# Patient Record
Sex: MALE | Race: WHITE | URBAN - METROPOLITAN AREA
[De-identification: names, ages, dates, MRNs, and addresses within clinical notes are randomized per-mention and may not be internally consistent; named-entity substitution may affect disease eponyms.]

---

## 2021-07-26 ENCOUNTER — OFFICE VISIT (OUTPATIENT)
Dept: INTERNAL MEDICINE CLINIC | Facility: CLINIC | Age: 56
End: 2021-07-26
Payer: COMMERCIAL

## 2021-07-26 VITALS
HEIGHT: 73 IN | TEMPERATURE: 98 F | RESPIRATION RATE: 16 BRPM | OXYGEN SATURATION: 98 % | BODY MASS INDEX: 31.54 KG/M2 | WEIGHT: 238 LBS | DIASTOLIC BLOOD PRESSURE: 74 MMHG | SYSTOLIC BLOOD PRESSURE: 124 MMHG | HEART RATE: 85 BPM

## 2021-07-26 DIAGNOSIS — Z00.00 PHYSICAL EXAM: Primary | ICD-10-CM

## 2021-07-26 DIAGNOSIS — K58.9 IRRITABLE BOWEL SYNDROME, UNSPECIFIED TYPE: ICD-10-CM

## 2021-07-26 DIAGNOSIS — Z91.013 SHELLFISH ALLERGY: ICD-10-CM

## 2021-07-26 DIAGNOSIS — B00.9 HSV-1 INFECTION: ICD-10-CM

## 2021-07-26 PROCEDURE — 3008F BODY MASS INDEX DOCD: CPT | Performed by: INTERNAL MEDICINE

## 2021-07-26 PROCEDURE — 99386 PREV VISIT NEW AGE 40-64: CPT | Performed by: INTERNAL MEDICINE

## 2021-07-26 PROCEDURE — 3078F DIAST BP <80 MM HG: CPT | Performed by: INTERNAL MEDICINE

## 2021-07-26 PROCEDURE — 3074F SYST BP LT 130 MM HG: CPT | Performed by: INTERNAL MEDICINE

## 2021-07-26 PROCEDURE — 93000 ELECTROCARDIOGRAM COMPLETE: CPT | Performed by: INTERNAL MEDICINE

## 2021-07-26 RX ORDER — DICYCLOMINE HCL 20 MG
20 TABLET ORAL DAILY PRN
COMMUNITY

## 2021-07-26 RX ORDER — ACYCLOVIR 400 MG/1
400 TABLET ORAL 3 TIMES DAILY
Qty: 30 TABLET | Refills: 0 | COMMUNITY
Start: 2021-07-26

## 2021-07-26 RX ORDER — EPINEPHRINE 0.3 MG/.3ML
0.3 INJECTION SUBCUTANEOUS DAILY PRN
COMMUNITY
Start: 2019-03-29 | End: 2021-07-26

## 2021-07-26 RX ORDER — EPINEPHRINE 0.3 MG/.3ML
0.3 INJECTION SUBCUTANEOUS SEE ADMIN INSTRUCTIONS
Qty: 1 EACH | Refills: 1 | Status: SHIPPED | OUTPATIENT
Start: 2021-07-26

## 2021-07-26 NOTE — PROGRESS NOTES
Marley Argueta is a 48year old male who presents for a complete physical exam.   HPI:   Pt complains of:    Patient presents with:  Establish Care: Pt would like to establish care with new PCP. Works for the [place], Recently moved to area from [place].  Pt Laterality Date   • INGUINAL HERNIA REPAIR Bilateral 1991   • TONSILLECTOMY        Family History   Problem Relation Age of Onset   • Heart Disorder Father    • Cancer Father       Social History:  Social History    Tobacco Use      Smoking status: Never S normoactive  Extremities exam: no clubbing no cyanosis no edema  Skin exam: No obvious wounds, no rashes  Neurological exam: Cranial nerves II through XII intact, no gross deficits  Musculoskeletal exam: no Arthritis appreciated, no obvious deformity  : documentation.     Anjali Ba,   7/26/2021  6:27 PM

## 2021-07-26 NOTE — PATIENT INSTRUCTIONS
1. Physical exam  Physical exam instruction: Improve diet and exercise, complete fasting labs in the near future and you will be called with results 5-7 days after completed, call with questions.   Call the central scheduling number at [phone] to sched

## 2021-08-27 ENCOUNTER — LAB ENCOUNTER (OUTPATIENT)
Dept: LAB | Age: 56
End: 2021-08-27
Attending: INTERNAL MEDICINE
Payer: COMMERCIAL

## 2021-08-27 DIAGNOSIS — Z00.00 PHYSICAL EXAM: ICD-10-CM

## 2021-08-27 LAB
ALBUMIN SERPL-MCNC: 4.1 G/DL (ref 3.4–5)
ALBUMIN/GLOB SERPL: 1.2 {RATIO} (ref 1–2)
ALP LIVER SERPL-CCNC: 57 U/L
ALT SERPL-CCNC: 32 U/L
ANION GAP SERPL CALC-SCNC: 8 MMOL/L (ref 0–18)
AST SERPL-CCNC: 15 U/L (ref 15–37)
BASOPHILS # BLD AUTO: 0.05 X10(3) UL (ref 0–0.2)
BASOPHILS NFR BLD AUTO: 0.9 %
BILIRUB SERPL-MCNC: 1.6 MG/DL (ref 0.1–2)
BILIRUB UR QL: NEGATIVE
BUN BLD-MCNC: 14 MG/DL (ref 7–18)
BUN/CREAT SERPL: 14.6 (ref 10–20)
CALCIUM BLD-MCNC: 8.9 MG/DL (ref 8.5–10.1)
CHLORIDE SERPL-SCNC: 109 MMOL/L (ref 98–112)
CHOLEST SMN-MCNC: 184 MG/DL (ref ?–200)
CO2 SERPL-SCNC: 24 MMOL/L (ref 21–32)
COLOR UR: YELLOW
COMPLEXED PSA SERPL-MCNC: 2.92 NG/ML (ref ?–4)
CREAT BLD-MCNC: 0.96 MG/DL
DEPRECATED RDW RBC AUTO: 40.2 FL (ref 35.1–46.3)
EOSINOPHIL # BLD AUTO: 0.31 X10(3) UL (ref 0–0.7)
EOSINOPHIL NFR BLD AUTO: 5.5 %
ERYTHROCYTE [DISTWIDTH] IN BLOOD BY AUTOMATED COUNT: 12.4 % (ref 11–15)
GLOBULIN PLAS-MCNC: 3.3 G/DL (ref 2.8–4.4)
GLUCOSE BLD-MCNC: 102 MG/DL (ref 70–99)
GLUCOSE UR-MCNC: NEGATIVE MG/DL
HCT VFR BLD AUTO: 48.1 %
HDLC SERPL-MCNC: 45 MG/DL (ref 40–59)
HGB BLD-MCNC: 15.9 G/DL
HGB UR QL STRIP.AUTO: NEGATIVE
IMM GRANULOCYTES # BLD AUTO: 0.03 X10(3) UL (ref 0–1)
IMM GRANULOCYTES NFR BLD: 0.5 %
KETONES UR-MCNC: NEGATIVE MG/DL
LDLC SERPL CALC-MCNC: 126 MG/DL (ref ?–100)
LEUKOCYTE ESTERASE UR QL STRIP.AUTO: NEGATIVE
LYMPHOCYTES # BLD AUTO: 2 X10(3) UL (ref 1–4)
LYMPHOCYTES NFR BLD AUTO: 35.3 %
M PROTEIN MFR SERPL ELPH: 7.4 G/DL (ref 6.4–8.2)
MCH RBC QN AUTO: 29.1 PG (ref 26–34)
MCHC RBC AUTO-ENTMCNC: 33.1 G/DL (ref 31–37)
MCV RBC AUTO: 88.1 FL
MONOCYTES # BLD AUTO: 0.51 X10(3) UL (ref 0.1–1)
MONOCYTES NFR BLD AUTO: 9 %
NEUTROPHILS # BLD AUTO: 2.76 X10 (3) UL (ref 1.5–7.7)
NEUTROPHILS # BLD AUTO: 2.76 X10(3) UL (ref 1.5–7.7)
NEUTROPHILS NFR BLD AUTO: 48.8 %
NITRITE UR QL STRIP.AUTO: NEGATIVE
NONHDLC SERPL-MCNC: 139 MG/DL (ref ?–130)
OSMOLALITY SERPL CALC.SUM OF ELEC: 293 MOSM/KG (ref 275–295)
PATIENT FASTING Y/N/NP: YES
PATIENT FASTING Y/N/NP: YES
PH UR: 5 [PH] (ref 5–8)
PLATELET # BLD AUTO: 233 10(3)UL (ref 150–450)
POTASSIUM SERPL-SCNC: 4.3 MMOL/L (ref 3.5–5.1)
PROT UR-MCNC: 30 MG/DL
RBC # BLD AUTO: 5.46 X10(6)UL
SODIUM SERPL-SCNC: 141 MMOL/L (ref 136–145)
SP GR UR STRIP: 1.03 (ref 1–1.03)
TRIGL SERPL-MCNC: 67 MG/DL (ref 30–149)
TSI SER-ACNC: 1.5 MIU/ML (ref 0.36–3.74)
UROBILINOGEN UR STRIP-ACNC: <2
VIT B12 SERPL-MCNC: 302 PG/ML (ref 193–986)
VIT D+METAB SERPL-MCNC: 20.9 NG/ML (ref 30–100)
VLDLC SERPL CALC-MCNC: 12 MG/DL (ref 0–30)
WBC # BLD AUTO: 5.7 X10(3) UL (ref 4–11)

## 2021-08-27 PROCEDURE — 85025 COMPLETE CBC W/AUTO DIFF WBC: CPT

## 2021-08-27 PROCEDURE — 82306 VITAMIN D 25 HYDROXY: CPT

## 2021-08-27 PROCEDURE — 81001 URINALYSIS AUTO W/SCOPE: CPT | Performed by: INTERNAL MEDICINE

## 2021-08-27 PROCEDURE — 84402 ASSAY OF FREE TESTOSTERONE: CPT

## 2021-08-27 PROCEDURE — 80053 COMPREHEN METABOLIC PANEL: CPT

## 2021-08-27 PROCEDURE — 82607 VITAMIN B-12: CPT

## 2021-08-27 PROCEDURE — 36415 COLL VENOUS BLD VENIPUNCTURE: CPT

## 2021-08-27 PROCEDURE — 84443 ASSAY THYROID STIM HORMONE: CPT

## 2021-08-27 PROCEDURE — 84403 ASSAY OF TOTAL TESTOSTERONE: CPT

## 2021-08-27 PROCEDURE — 80061 LIPID PANEL: CPT

## 2021-09-03 LAB
TESTOSTERONE, FREE, S: 17.9 NG/DL
TESTOSTERONE, TOTAL, S: 525 NG/DL

## 2021-09-09 ENCOUNTER — PATIENT MESSAGE (OUTPATIENT)
Dept: INTERNAL MEDICINE CLINIC | Facility: CLINIC | Age: 56
End: 2021-09-09

## 2021-09-09 NOTE — TELEPHONE ENCOUNTER
From: Kendall Solis Doelling  To: Mari Parmar DO  Sent: 9/9/2021 5:25 AM CDT  Subject: Test Results Question    PSA was 1.43 last year. Making sure this increase (nearly double) is ok.  Thanks

## 2021-09-10 ENCOUNTER — TELEPHONE (OUTPATIENT)
Dept: INTERNAL MEDICINE CLINIC | Facility: CLINIC | Age: 56
End: 2021-09-10

## 2021-09-10 DIAGNOSIS — R97.20 RISING PSA LEVEL: Primary | ICD-10-CM

## 2021-10-10 ENCOUNTER — PATIENT MESSAGE (OUTPATIENT)
Dept: INTERNAL MEDICINE CLINIC | Facility: CLINIC | Age: 56
End: 2021-10-10

## 2021-10-10 DIAGNOSIS — R97.20 RISING PSA LEVEL: Primary | ICD-10-CM

## 2021-10-11 ENCOUNTER — PATIENT MESSAGE (OUTPATIENT)
Dept: INTERNAL MEDICINE CLINIC | Facility: CLINIC | Age: 56
End: 2021-10-11

## 2021-10-11 NOTE — TELEPHONE ENCOUNTER
From: Bartholomew Lundborg Doelling  To: Tesfaye Moreno DO  Sent: 9/9/2021 5:25 AM CDT  Subject: Test Results Question    PSA was 1.43 last year. Making sure this increase (nearly double) is ok.  Thanks

## 2021-10-11 NOTE — TELEPHONE ENCOUNTER
To Dr. Christianne Pereira to advise on new patient request for patients son. UC advised for son due to acute symptoms.

## 2021-10-12 NOTE — TELEPHONE ENCOUNTER
From: Roxie Cardoso  Sent: 10/11/2021 6:07 PM CDT  To: [facility] Clinical Staff  Subject: RE: Test Results Question    Excellent.  Thank you!!!

## 2022-02-22 ENCOUNTER — TELEPHONE (OUTPATIENT)
Dept: INTERNAL MEDICINE CLINIC | Facility: CLINIC | Age: 57
End: 2022-02-22

## 2022-02-22 RX ORDER — ACYCLOVIR 400 MG/1
400 TABLET ORAL 3 TIMES DAILY
Qty: 30 TABLET | Refills: 1 | Status: SHIPPED | OUTPATIENT
Start: 2022-02-22

## 2022-02-22 NOTE — TELEPHONE ENCOUNTER
Per OV 7/26/21:  \"4. HSV-1 infection  Acyclovir tabs are fine with me to use as you have been, realize there is different things out there such as Denavir cream, but you would have to call me for that. - acyclovir 400 MG Oral Tab; Take 1 tablet (400 mg total) by mouth 3 (three) times daily. For 3 to 4 days as directed  Dispense: 30 tablet; Refill: 0\"    To Dr. Terence Feldman to please advise on refill, med was added historically by you on 7/26/21 but it doesn't look like you have ever prescribed before. Patient requesting via my chart.

## 2022-06-22 ENCOUNTER — TELEPHONE (OUTPATIENT)
Dept: INTERNAL MEDICINE CLINIC | Facility: CLINIC | Age: 57
End: 2022-06-22

## 2022-06-29 ENCOUNTER — PATIENT MESSAGE (OUTPATIENT)
Dept: INTERNAL MEDICINE CLINIC | Facility: CLINIC | Age: 57
End: 2022-06-29

## 2022-08-01 ENCOUNTER — OFFICE VISIT (OUTPATIENT)
Dept: INTERNAL MEDICINE CLINIC | Facility: CLINIC | Age: 57
End: 2022-08-01
Payer: COMMERCIAL

## 2022-08-01 VITALS
BODY MASS INDEX: 32.36 KG/M2 | HEIGHT: 73 IN | SYSTOLIC BLOOD PRESSURE: 124 MMHG | OXYGEN SATURATION: 97 % | WEIGHT: 244.13 LBS | HEART RATE: 72 BPM | DIASTOLIC BLOOD PRESSURE: 80 MMHG

## 2022-08-01 DIAGNOSIS — B00.9 HSV-1 INFECTION: ICD-10-CM

## 2022-08-01 DIAGNOSIS — Z00.00 ANNUAL PHYSICAL EXAM: Primary | ICD-10-CM

## 2022-08-01 DIAGNOSIS — B35.1 ONYCHOMYCOSIS: ICD-10-CM

## 2022-08-01 DIAGNOSIS — Z13.220 SCREENING FOR LIPOID DISORDERS: ICD-10-CM

## 2022-08-01 DIAGNOSIS — Z12.5 SCREENING FOR PROSTATE CANCER: ICD-10-CM

## 2022-08-01 DIAGNOSIS — Z13.0 SCREENING FOR DEFICIENCY ANEMIA: ICD-10-CM

## 2022-08-01 DIAGNOSIS — Z13.1 SCREENING FOR DIABETES MELLITUS: ICD-10-CM

## 2022-08-01 DIAGNOSIS — Z12.11 SCREENING FOR COLON CANCER: ICD-10-CM

## 2022-08-01 DIAGNOSIS — K58.9 IRRITABLE BOWEL SYNDROME, UNSPECIFIED TYPE: ICD-10-CM

## 2022-08-01 DIAGNOSIS — Z13.29 SCREENING FOR THYROID DISORDER: ICD-10-CM

## 2022-08-01 DIAGNOSIS — R20.2 BILATERAL LEG PARESTHESIA: ICD-10-CM

## 2022-08-01 PROCEDURE — 3079F DIAST BP 80-89 MM HG: CPT | Performed by: INTERNAL MEDICINE

## 2022-08-01 PROCEDURE — 99396 PREV VISIT EST AGE 40-64: CPT | Performed by: INTERNAL MEDICINE

## 2022-08-01 PROCEDURE — 3008F BODY MASS INDEX DOCD: CPT | Performed by: INTERNAL MEDICINE

## 2022-08-01 PROCEDURE — 3074F SYST BP LT 130 MM HG: CPT | Performed by: INTERNAL MEDICINE

## 2022-08-01 PROCEDURE — 90715 TDAP VACCINE 7 YRS/> IM: CPT | Performed by: INTERNAL MEDICINE

## 2022-08-01 PROCEDURE — 90471 IMMUNIZATION ADMIN: CPT | Performed by: INTERNAL MEDICINE

## 2022-08-01 RX ORDER — ACYCLOVIR 400 MG/1
400 TABLET ORAL 3 TIMES DAILY
Qty: 30 TABLET | Refills: 1 | Status: SHIPPED | OUTPATIENT
Start: 2022-08-01

## 2022-08-01 RX ORDER — SILDENAFIL CITRATE 20 MG/1
10 TABLET ORAL
Qty: 30 TABLET | Refills: 1 | Status: SHIPPED | OUTPATIENT
Start: 2022-08-01

## 2022-08-01 RX ORDER — EPINEPHRINE 0.3 MG/.3ML
0.3 INJECTION SUBCUTANEOUS SEE ADMIN INSTRUCTIONS
Qty: 1 EACH | Refills: 3 | Status: SHIPPED | OUTPATIENT
Start: 2022-08-01

## 2022-08-01 RX ORDER — DICYCLOMINE HCL 20 MG
20 TABLET ORAL
Qty: 120 TABLET | Refills: 3 | Status: SHIPPED | OUTPATIENT
Start: 2022-08-01

## 2022-08-01 RX ORDER — HYDROCORTISONE 25 MG/G
1 CREAM TOPICAL 2 TIMES DAILY
Qty: 1 EACH | Refills: 3 | Status: SHIPPED | OUTPATIENT
Start: 2022-08-01

## 2022-08-04 ENCOUNTER — PATIENT MESSAGE (OUTPATIENT)
Dept: INTERNAL MEDICINE CLINIC | Facility: CLINIC | Age: 57
End: 2022-08-04

## 2022-08-04 ENCOUNTER — LAB ENCOUNTER (OUTPATIENT)
Dept: LAB | Age: 57
End: 2022-08-04
Attending: INTERNAL MEDICINE
Payer: COMMERCIAL

## 2022-08-04 DIAGNOSIS — E55.9 VITAMIN D DEFICIENCY: Primary | ICD-10-CM

## 2022-08-04 DIAGNOSIS — R20.2 BILATERAL LEG PARESTHESIA: ICD-10-CM

## 2022-08-04 DIAGNOSIS — E55.9 VITAMIN D DEFICIENCY: ICD-10-CM

## 2022-08-04 DIAGNOSIS — Z13.220 SCREENING FOR LIPOID DISORDERS: ICD-10-CM

## 2022-08-04 DIAGNOSIS — Z13.1 SCREENING FOR DIABETES MELLITUS: ICD-10-CM

## 2022-08-04 DIAGNOSIS — Z12.5 SCREENING FOR PROSTATE CANCER: ICD-10-CM

## 2022-08-04 DIAGNOSIS — Z13.29 SCREENING FOR THYROID DISORDER: ICD-10-CM

## 2022-08-04 DIAGNOSIS — Z00.00 ANNUAL PHYSICAL EXAM: ICD-10-CM

## 2022-08-04 DIAGNOSIS — Z13.0 SCREENING FOR DEFICIENCY ANEMIA: ICD-10-CM

## 2022-08-04 DIAGNOSIS — R97.20 ELEVATED PSA: ICD-10-CM

## 2022-08-04 LAB
ALBUMIN SERPL-MCNC: 4 G/DL (ref 3.4–5)
ALBUMIN/GLOB SERPL: 1.1 {RATIO} (ref 1–2)
ALP LIVER SERPL-CCNC: 58 U/L
ALT SERPL-CCNC: 29 U/L
ANION GAP SERPL CALC-SCNC: 7 MMOL/L (ref 0–18)
AST SERPL-CCNC: 15 U/L (ref 15–37)
BASOPHILS # BLD AUTO: 0.05 X10(3) UL (ref 0–0.2)
BASOPHILS NFR BLD AUTO: 0.8 %
BILIRUB SERPL-MCNC: 1.6 MG/DL (ref 0.1–2)
BILIRUB UR QL: NEGATIVE
BUN BLD-MCNC: 17 MG/DL (ref 7–18)
BUN/CREAT SERPL: 17 (ref 10–20)
CALCIUM BLD-MCNC: 9.6 MG/DL (ref 8.5–10.1)
CHLORIDE SERPL-SCNC: 109 MMOL/L (ref 98–112)
CHOLEST SERPL-MCNC: 188 MG/DL (ref ?–200)
CO2 SERPL-SCNC: 25 MMOL/L (ref 21–32)
COLOR UR: YELLOW
COMPLEXED PSA SERPL-MCNC: 4.76 NG/ML (ref ?–4)
CREAT BLD-MCNC: 1 MG/DL
DEPRECATED RDW RBC AUTO: 39.1 FL (ref 35.1–46.3)
EOSINOPHIL # BLD AUTO: 0.32 X10(3) UL (ref 0–0.7)
EOSINOPHIL NFR BLD AUTO: 5.1 %
ERYTHROCYTE [DISTWIDTH] IN BLOOD BY AUTOMATED COUNT: 12.2 % (ref 11–15)
ERYTHROCYTE [SEDIMENTATION RATE] IN BLOOD: 12 MM/HR
FASTING PATIENT LIPID ANSWER: YES
FASTING STATUS PATIENT QL REPORTED: YES
GFR SERPLBLD BASED ON 1.73 SQ M-ARVRAT: 89 ML/MIN/1.73M2 (ref 60–?)
GLOBULIN PLAS-MCNC: 3.6 G/DL (ref 2.8–4.4)
GLUCOSE BLD-MCNC: 91 MG/DL (ref 70–99)
GLUCOSE UR-MCNC: NEGATIVE MG/DL
HCT VFR BLD AUTO: 48.5 %
HDLC SERPL-MCNC: 39 MG/DL (ref 40–59)
HGB BLD-MCNC: 15.9 G/DL
HGB UR QL STRIP.AUTO: NEGATIVE
IMM GRANULOCYTES # BLD AUTO: 0.02 X10(3) UL (ref 0–1)
IMM GRANULOCYTES NFR BLD: 0.3 %
KETONES UR-MCNC: NEGATIVE MG/DL
LDLC SERPL CALC-MCNC: 134 MG/DL (ref ?–100)
LEUKOCYTE ESTERASE UR QL STRIP.AUTO: NEGATIVE
LYMPHOCYTES # BLD AUTO: 1.97 X10(3) UL (ref 1–4)
LYMPHOCYTES NFR BLD AUTO: 31.6 %
MCH RBC QN AUTO: 28.6 PG (ref 26–34)
MCHC RBC AUTO-ENTMCNC: 32.8 G/DL (ref 31–37)
MCV RBC AUTO: 87.4 FL
MONOCYTES # BLD AUTO: 0.54 X10(3) UL (ref 0.1–1)
MONOCYTES NFR BLD AUTO: 8.7 %
NEUTROPHILS # BLD AUTO: 3.33 X10 (3) UL (ref 1.5–7.7)
NEUTROPHILS # BLD AUTO: 3.33 X10(3) UL (ref 1.5–7.7)
NEUTROPHILS NFR BLD AUTO: 53.5 %
NITRITE UR QL STRIP.AUTO: NEGATIVE
NONHDLC SERPL-MCNC: 149 MG/DL (ref ?–130)
OSMOLALITY SERPL CALC.SUM OF ELEC: 293 MOSM/KG (ref 275–295)
PH UR: 5.5 [PH] (ref 5–8)
PLATELET # BLD AUTO: 242 10(3)UL (ref 150–450)
POTASSIUM SERPL-SCNC: 4.5 MMOL/L (ref 3.5–5.1)
PROT SERPL-MCNC: 7.6 G/DL (ref 6.4–8.2)
PROT UR-MCNC: NEGATIVE MG/DL
RBC # BLD AUTO: 5.55 X10(6)UL
SODIUM SERPL-SCNC: 141 MMOL/L (ref 136–145)
SP GR UR STRIP: 1.02 (ref 1–1.03)
TRIGL SERPL-MCNC: 80 MG/DL (ref 30–149)
TSI SER-ACNC: 1.38 MIU/ML (ref 0.36–3.74)
UROBILINOGEN UR STRIP-ACNC: 0.2
VIT B12 SERPL-MCNC: 330 PG/ML (ref 193–986)
VIT D+METAB SERPL-MCNC: 23.5 NG/ML (ref 30–100)
VLDLC SERPL CALC-MCNC: 15 MG/DL (ref 0–30)
WBC # BLD AUTO: 6.2 X10(3) UL (ref 4–11)

## 2022-08-04 PROCEDURE — 81003 URINALYSIS AUTO W/O SCOPE: CPT

## 2022-08-04 PROCEDURE — 80061 LIPID PANEL: CPT

## 2022-08-04 PROCEDURE — 85025 COMPLETE CBC W/AUTO DIFF WBC: CPT

## 2022-08-04 PROCEDURE — 36415 COLL VENOUS BLD VENIPUNCTURE: CPT

## 2022-08-04 PROCEDURE — 86038 ANTINUCLEAR ANTIBODIES: CPT

## 2022-08-04 PROCEDURE — 84443 ASSAY THYROID STIM HORMONE: CPT

## 2022-08-04 PROCEDURE — 82607 VITAMIN B-12: CPT

## 2022-08-04 PROCEDURE — 80053 COMPREHEN METABOLIC PANEL: CPT

## 2022-08-04 PROCEDURE — 85652 RBC SED RATE AUTOMATED: CPT

## 2022-08-04 PROCEDURE — 82306 VITAMIN D 25 HYDROXY: CPT

## 2022-08-04 NOTE — TELEPHONE ENCOUNTER
From: Yasmani Slaughter  To: Justin Mcclendon MD  Sent: 8/4/2022 2:48 PM CDT  Subject: Question regarding PSA SCREEN    I am concerned (hopefully inappropriately!) about PSA and family history (dad).    PSA results:  2/24/20 1.43  8/27/21 2.92  Today 4.76

## 2022-08-08 ENCOUNTER — PATIENT MESSAGE (OUTPATIENT)
Dept: INTERNAL MEDICINE CLINIC | Facility: CLINIC | Age: 57
End: 2022-08-08

## 2022-08-08 LAB — NUCLEAR IGG TITR SER IF: NEGATIVE {TITER}

## 2022-08-09 ENCOUNTER — TELEPHONE (OUTPATIENT)
Dept: INTERNAL MEDICINE CLINIC | Facility: CLINIC | Age: 57
End: 2022-08-09

## 2022-08-20 ENCOUNTER — PATIENT MESSAGE (OUTPATIENT)
Dept: INTERNAL MEDICINE CLINIC | Facility: CLINIC | Age: 57
End: 2022-08-20

## 2022-08-20 ENCOUNTER — HOSPITAL ENCOUNTER (OUTPATIENT)
Dept: GENERAL RADIOLOGY | Age: 57
Discharge: HOME | End: 2022-08-20
Attending: INTERNAL MEDICINE
Payer: COMMERCIAL

## 2022-08-20 DIAGNOSIS — R20.2 BILATERAL LEG PARESTHESIA: ICD-10-CM

## 2022-08-20 PROCEDURE — 72100 X-RAY EXAM L-S SPINE 2/3 VWS: CPT | Performed by: INTERNAL MEDICINE

## 2022-08-22 ENCOUNTER — PATIENT MESSAGE (OUTPATIENT)
Dept: INTERNAL MEDICINE CLINIC | Facility: CLINIC | Age: 57
End: 2022-08-22

## 2022-08-22 RX ORDER — SILDENAFIL CITRATE 20 MG/1
10 TABLET ORAL
Qty: 30 TABLET | Refills: 1 | Status: SHIPPED | OUTPATIENT
Start: 2022-08-22

## 2022-08-25 NOTE — TELEPHONE ENCOUNTER
I sent a [employer] message as fit test was negative    X-ray with degenerative disc and facet disease at L4-L5 and L5-S1    [employer] message sent

## 2022-10-03 ENCOUNTER — PATIENT MESSAGE (OUTPATIENT)
Dept: INTERNAL MEDICINE CLINIC | Facility: CLINIC | Age: 57
End: 2022-10-03

## 2022-10-03 ENCOUNTER — TELEPHONE (OUTPATIENT)
Dept: INTERNAL MEDICINE CLINIC | Facility: CLINIC | Age: 57
End: 2022-10-03

## 2022-10-03 NOTE — TELEPHONE ENCOUNTER
Spoke with patient who reports he has been feeling overwhelmed lately with increased stressors and triggering work events. He has an appointment later today with his therapist and is hoping to get in to see Dr. Maame Hay as well. He did have COVID in September but reports no fevers since 9/23/22. Foginess seems to be lingering. Patient denied any feelings of suicidal or homicidal ideation. He is meeting with his therapist later today. TO Dr. Maame Hay-- scheduled patient for tomorrow afternoon. Patient needed later appointment due to work schedule.

## 2022-10-05 ENCOUNTER — TELEPHONE (OUTPATIENT)
Dept: INTERNAL MEDICINE CLINIC | Facility: CLINIC | Age: 57
End: 2022-10-05

## 2023-03-31 RX ORDER — CITALOPRAM 10 MG/1
10 TABLET ORAL DAILY
Qty: 90 TABLET | Refills: 0 | Status: SHIPPED | OUTPATIENT
Start: 2023-03-31

## 2023-06-12 ENCOUNTER — PATIENT MESSAGE (OUTPATIENT)
Dept: INTERNAL MEDICINE CLINIC | Facility: CLINIC | Age: 58
End: 2023-06-12

## 2023-06-12 NOTE — TELEPHONE ENCOUNTER
From: Varinder Mccormick  To: Yevgeniy Andrew MD  Sent: 6/12/2023 10:52 AM CDT  Subject: PSA test    I have a follow up appt with Dr. Aureliano Keller 8/22/23. She asked that I bring lab and test results.  Can you add a PSA (and anything you think is helpful) to my current blood work ordered in preparation for my 8/15 annual physical? THANKS

## 2023-06-30 RX ORDER — CITALOPRAM HYDROBROMIDE 10 MG/1
TABLET ORAL
Qty: 90 TABLET | Refills: 3 | Status: SHIPPED | OUTPATIENT
Start: 2023-06-30

## 2023-07-02 DIAGNOSIS — B00.9 HSV-1 INFECTION: ICD-10-CM

## 2023-07-03 RX ORDER — ACYCLOVIR 400 MG/1
400 TABLET ORAL 3 TIMES DAILY
Qty: 30 TABLET | Refills: 0 | Status: SHIPPED | OUTPATIENT
Start: 2023-07-03

## 2023-08-15 ENCOUNTER — LAB ENCOUNTER (OUTPATIENT)
Dept: LAB | Age: 58
End: 2023-08-15
Attending: INTERNAL MEDICINE
Payer: COMMERCIAL

## 2023-08-15 DIAGNOSIS — Z12.5 SCREENING FOR PROSTATE CANCER: ICD-10-CM

## 2023-08-15 DIAGNOSIS — Z13.1 SCREENING FOR DIABETES MELLITUS: ICD-10-CM

## 2023-08-15 DIAGNOSIS — Z13.29 SCREENING FOR THYROID DISORDER: ICD-10-CM

## 2023-08-15 DIAGNOSIS — F41.9 ANXIETY: ICD-10-CM

## 2023-08-15 DIAGNOSIS — R97.20 ELEVATED PSA: ICD-10-CM

## 2023-08-15 DIAGNOSIS — E55.9 VITAMIN D DEFICIENCY: ICD-10-CM

## 2023-08-15 DIAGNOSIS — Z13.0 SCREENING FOR DEFICIENCY ANEMIA: ICD-10-CM

## 2023-08-15 DIAGNOSIS — R20.2 PARESTHESIAS: ICD-10-CM

## 2023-08-15 DIAGNOSIS — Z13.220 SCREENING FOR LIPOID DISORDERS: ICD-10-CM

## 2023-08-15 LAB
ALBUMIN SERPL-MCNC: 3.9 G/DL (ref 3.4–5)
ALBUMIN/GLOB SERPL: 1.2 {RATIO} (ref 1–2)
ALP LIVER SERPL-CCNC: 56 U/L
ALT SERPL-CCNC: 28 U/L
ANION GAP SERPL CALC-SCNC: 5 MMOL/L (ref 0–18)
AST SERPL-CCNC: 15 U/L (ref 15–37)
BASOPHILS # BLD AUTO: 0.05 X10(3) UL (ref 0–0.2)
BASOPHILS NFR BLD AUTO: 0.9 %
BILIRUB SERPL-MCNC: 1.3 MG/DL (ref 0.1–2)
BUN BLD-MCNC: 14 MG/DL (ref 7–18)
BUN/CREAT SERPL: 13.9 (ref 10–20)
CALCIUM BLD-MCNC: 9.1 MG/DL (ref 8.5–10.1)
CHLORIDE SERPL-SCNC: 109 MMOL/L (ref 98–112)
CHOLEST SERPL-MCNC: 168 MG/DL (ref ?–200)
CO2 SERPL-SCNC: 27 MMOL/L (ref 21–32)
CREAT BLD-MCNC: 1.01 MG/DL
DEPRECATED RDW RBC AUTO: 40.1 FL (ref 35.1–46.3)
EGFRCR SERPLBLD CKD-EPI 2021: 87 ML/MIN/1.73M2 (ref 60–?)
EOSINOPHIL # BLD AUTO: 0.2 X10(3) UL (ref 0–0.7)
EOSINOPHIL NFR BLD AUTO: 3.6 %
ERYTHROCYTE [DISTWIDTH] IN BLOOD BY AUTOMATED COUNT: 12.5 % (ref 11–15)
EST. AVERAGE GLUCOSE BLD GHB EST-MCNC: 111 MG/DL (ref 68–126)
FASTING PATIENT LIPID ANSWER: YES
FASTING STATUS PATIENT QL REPORTED: YES
GLOBULIN PLAS-MCNC: 3.2 G/DL (ref 2.8–4.4)
GLUCOSE BLD-MCNC: 95 MG/DL (ref 70–99)
HBA1C MFR BLD: 5.5 % (ref ?–5.7)
HCT VFR BLD AUTO: 46.1 %
HDLC SERPL-MCNC: 42 MG/DL (ref 40–59)
HGB BLD-MCNC: 15.4 G/DL
IMM GRANULOCYTES # BLD AUTO: 0.02 X10(3) UL (ref 0–1)
IMM GRANULOCYTES NFR BLD: 0.4 %
LDLC SERPL CALC-MCNC: 115 MG/DL (ref ?–100)
LYMPHOCYTES # BLD AUTO: 1.64 X10(3) UL (ref 1–4)
LYMPHOCYTES NFR BLD AUTO: 29.5 %
MCH RBC QN AUTO: 29.2 PG (ref 26–34)
MCHC RBC AUTO-ENTMCNC: 33.4 G/DL (ref 31–37)
MCV RBC AUTO: 87.3 FL
MONOCYTES # BLD AUTO: 0.46 X10(3) UL (ref 0.1–1)
MONOCYTES NFR BLD AUTO: 8.3 %
NEUTROPHILS # BLD AUTO: 3.19 X10 (3) UL (ref 1.5–7.7)
NEUTROPHILS # BLD AUTO: 3.19 X10(3) UL (ref 1.5–7.7)
NEUTROPHILS NFR BLD AUTO: 57.3 %
NONHDLC SERPL-MCNC: 126 MG/DL (ref ?–130)
OSMOLALITY SERPL CALC.SUM OF ELEC: 292 MOSM/KG (ref 275–295)
PLATELET # BLD AUTO: 214 10(3)UL (ref 150–450)
POTASSIUM SERPL-SCNC: 4.4 MMOL/L (ref 3.5–5.1)
PROT SERPL-MCNC: 7.1 G/DL (ref 6.4–8.2)
PSA SERPL-MCNC: 2 NG/ML (ref ?–4)
RBC # BLD AUTO: 5.28 X10(6)UL
SODIUM SERPL-SCNC: 141 MMOL/L (ref 136–145)
TRIGL SERPL-MCNC: 58 MG/DL (ref 30–149)
TSI SER-ACNC: 1.17 MIU/ML (ref 0.36–3.74)
VIT D+METAB SERPL-MCNC: 25.6 NG/ML (ref 30–100)
VLDLC SERPL CALC-MCNC: 10 MG/DL (ref 0–30)
WBC # BLD AUTO: 5.6 X10(3) UL (ref 4–11)

## 2023-08-15 PROCEDURE — 80053 COMPREHEN METABOLIC PANEL: CPT

## 2023-08-15 PROCEDURE — 83036 HEMOGLOBIN GLYCOSYLATED A1C: CPT

## 2023-08-15 PROCEDURE — 84443 ASSAY THYROID STIM HORMONE: CPT

## 2023-08-15 PROCEDURE — 85025 COMPLETE CBC W/AUTO DIFF WBC: CPT

## 2023-08-15 PROCEDURE — 36415 COLL VENOUS BLD VENIPUNCTURE: CPT

## 2023-08-15 PROCEDURE — 82306 VITAMIN D 25 HYDROXY: CPT

## 2023-08-15 PROCEDURE — 84153 ASSAY OF PSA TOTAL: CPT

## 2023-08-15 PROCEDURE — 80061 LIPID PANEL: CPT

## 2023-08-18 ENCOUNTER — OFFICE VISIT (OUTPATIENT)
Dept: INTERNAL MEDICINE CLINIC | Facility: CLINIC | Age: 58
End: 2023-08-18

## 2023-08-18 VITALS
DIASTOLIC BLOOD PRESSURE: 72 MMHG | WEIGHT: 242 LBS | OXYGEN SATURATION: 96 % | TEMPERATURE: 99 F | BODY MASS INDEX: 32.07 KG/M2 | HEART RATE: 71 BPM | SYSTOLIC BLOOD PRESSURE: 124 MMHG | HEIGHT: 73 IN

## 2023-08-18 DIAGNOSIS — F41.9 ANXIETY: ICD-10-CM

## 2023-08-18 DIAGNOSIS — E55.9 VITAMIN D DEFICIENCY: ICD-10-CM

## 2023-08-18 DIAGNOSIS — R97.20 ELEVATED PSA: ICD-10-CM

## 2023-08-18 DIAGNOSIS — Z12.11 SCREENING FOR COLON CANCER: ICD-10-CM

## 2023-08-18 DIAGNOSIS — K58.9 IRRITABLE BOWEL SYNDROME, UNSPECIFIED TYPE: ICD-10-CM

## 2023-08-18 DIAGNOSIS — Z00.00 ANNUAL PHYSICAL EXAM: Primary | ICD-10-CM

## 2023-08-18 DIAGNOSIS — Z56.6 WORK-RELATED STRESS: ICD-10-CM

## 2023-08-18 PROCEDURE — 3074F SYST BP LT 130 MM HG: CPT | Performed by: INTERNAL MEDICINE

## 2023-08-18 PROCEDURE — 3008F BODY MASS INDEX DOCD: CPT | Performed by: INTERNAL MEDICINE

## 2023-08-18 PROCEDURE — 99396 PREV VISIT EST AGE 40-64: CPT | Performed by: INTERNAL MEDICINE

## 2023-08-18 PROCEDURE — 3078F DIAST BP <80 MM HG: CPT | Performed by: INTERNAL MEDICINE

## 2023-08-18 RX ORDER — CYCLOBENZAPRINE HCL 5 MG
5 TABLET ORAL NIGHTLY
Qty: 30 TABLET | Refills: 0 | Status: SHIPPED | OUTPATIENT
Start: 2023-08-18

## 2023-08-18 SDOH — HEALTH STABILITY - MENTAL HEALTH: OTHER PHYSICAL AND MENTAL STRAIN RELATED TO WORK: Z56.6

## 2023-08-18 NOTE — PATIENT INSTRUCTIONS
- You were seen in clinic for regular annual check-up. We did review your most recent set of blood work and you have done a great job of bringing down your cholesterol over time. We discussed holding off on cholesterol medication for now. We may need to consider this in the future if your numbers increase over time    - We are very to happy to hear that your work related stress and anxiety is under great control. Lets continue with your good stress coping mechanisms. Continue following with your therapist    - We will continue with the current medications in place for citalopram.    -The temporomandibular joint/TMJ pain may be related to your recent oral cleaning/manipulation. Lower suspicion that this is grinding teeth. However, we should try to reduce down the inflammation and improve the pain moving forward    We recommended:  - Trial of muscle relaxer to relieve any muscle tension in the area, cyclobenzaprine 5 mg nightly as needed. You may come off of the medication if symptoms improve in the next few days  - You can continue ibuprofen 200-400 mg every 4-6 hours as needed for anti-inflammatory pain relief    -Your PSA level has normalized, we will monitor this closely  -Please continue monitoring the numbness and tingling of the toes. If there is progression, we should consider EMG for further evaluation as your primary work-up has come back unremarkable. We could also consider antinerve related medication to reduce flareups  - Please continue to eat a varied diet including recommended servings of vegetables, fruits, and low fat dairy. Minimize high saturated fats (such as fast foods) and high sugar intake (such as soda)  - We recommend 150 minutes of moderate intensity exercise (brisk walking, swimming) weekly to maintain your current weight. Targeted weight loss will require more vigorous exercise or more than 150 minutes/week.     Return to clinic in 6 months for follow-up    You are doing outstanding with your overall health. Keep up the great work!

## 2023-08-22 ENCOUNTER — PATIENT MESSAGE (OUTPATIENT)
Dept: INTERNAL MEDICINE CLINIC | Facility: CLINIC | Age: 58
End: 2023-08-22

## 2023-08-22 NOTE — TELEPHONE ENCOUNTER
From: Aida Villavicencio  To: Jeanne Daniels MD  Sent: 8/22/2023 4:31 PM CDT  Subject: Follow up message from Dr EDWARD (urologist)    I just saw the urologist, and she believes I can be followed by you until further notice. She wants me to have an annual prostate exam an annual.PSA LEVEL and if there are any changes to the exam or any increase in my level, she will follow up with me at that time.

## 2023-10-02 RX ORDER — CYCLOBENZAPRINE HCL 5 MG
5 TABLET ORAL NIGHTLY
Qty: 30 TABLET | Refills: 0 | OUTPATIENT
Start: 2023-10-02

## 2023-10-02 NOTE — TELEPHONE ENCOUNTER
Current refill request refused due to refill is either a duplicate request or has active refills at the pharmacy. Check previous templates.     Requested Prescriptions     Refused Prescriptions Disp Refills    CYCLOBENZAPRINE 5 MG Oral Tab [Pharmacy Med Name: CYCLOBENZAPRINE 5 MG TABLET] 30 tablet 0     Sig: TAKE 1 TABLET BY MOUTH EVERY DAY AT NIGHT     Refused By: Amanda Tyler     Reason for Refusal: Refill not appropriate

## 2023-10-04 RX ORDER — CYCLOBENZAPRINE HCL 5 MG
5 TABLET ORAL NIGHTLY
Qty: 30 TABLET | Refills: 0 | OUTPATIENT
Start: 2023-10-04

## 2023-10-05 NOTE — TELEPHONE ENCOUNTER
For acute issue  Refill request has failed the Ambulatory Medication Refill Standing Order and is routed to the primary physician to review the following:    Requested Prescriptions     Refused Prescriptions Disp Refills    CYCLOBENZAPRINE 5 MG Oral Tab [Pharmacy Med Name: CYCLOBENZAPRINE 5 MG TABLET] 30 tablet 0     Sig: TAKE 1 TABLET BY MOUTH EVERY DAY AT NIGHT     Refused By: Alexandre Davenport     Reason for Refusal: Refill not appropriate

## 2023-11-02 ENCOUNTER — PATIENT MESSAGE (OUTPATIENT)
Dept: INTERNAL MEDICINE CLINIC | Facility: CLINIC | Age: 58
End: 2023-11-02

## 2023-11-02 NOTE — TELEPHONE ENCOUNTER
From: Herminio Mooney  Sent: 11/2/2023 9:13 AM CDT  To: [facility] Clinical Staff  Subject: Partner Jade Ginger) w/COVID    I noticed that I left off he tested positive for COVID last evening

## 2024-01-07 ENCOUNTER — PATIENT MESSAGE (OUTPATIENT)
Dept: INTERNAL MEDICINE CLINIC | Facility: CLINIC | Age: 59
End: 2024-01-07

## 2024-01-09 NOTE — TELEPHONE ENCOUNTER
From: Wilian King  To: Edgar Lala  Sent: 1/7/2024 11:25 AM CST  Subject: Need copy of lab order     I am going to complete the one day fecal immunochemical test and it states I need to include a copy of the lab order provided by my physician in envelope. Can a copy of the lab order be mailed to me?  Thanks and Happy Nee Year!

## 2024-01-22 ENCOUNTER — APPOINTMENT (OUTPATIENT)
Dept: LAB | Facility: HOSPITAL | Age: 59
End: 2024-01-22
Attending: INTERNAL MEDICINE
Payer: COMMERCIAL

## 2024-01-22 PROCEDURE — 82274 ASSAY TEST FOR BLOOD FECAL: CPT

## 2024-01-25 ENCOUNTER — TELEPHONE (OUTPATIENT)
Dept: INTERNAL MEDICINE CLINIC | Facility: CLINIC | Age: 59
End: 2024-01-25

## 2024-01-25 DIAGNOSIS — R19.5 POSITIVE FIT (FECAL IMMUNOCHEMICAL TEST): Primary | ICD-10-CM

## 2024-01-25 LAB — HEMOCCULT STL QL: POSITIVE

## 2024-01-25 NOTE — TELEPHONE ENCOUNTER
S/w patient and relayed MD message.  Verbalizes understanding and agreement with tx. plan     Pt will call Dr Olivares's office to scheduled GI appt.    Pt provided with Dr contact info.    ER advised for worsening sx's

## 2024-01-25 NOTE — TELEPHONE ENCOUNTER
Please refer the patient that the fecal occult blood test did come back positive.  He may need to see gastroenterology for consideration of colonoscopy to determine where this microscopic blood was detected (I.e. polyp).  Can see Dr. Law:    Ke Law MD [address] [place] [id number] [phone]

## 2024-02-01 ENCOUNTER — NURSE ONLY (OUTPATIENT)
Facility: CLINIC | Age: 59
End: 2024-02-01

## 2024-02-01 DIAGNOSIS — Z12.11 SCREENING FOR COLON CANCER: Primary | ICD-10-CM

## 2024-02-01 NOTE — PROGRESS NOTES
Dx: +FIT test   Colonoscopy with MAC sedation  Split dose golytely, sent to pharmacy  Please review prep instructions with patient    - If the patient is taking oral diabetic medications then they should HOLD diabetic medications the night before and/or the day of the procedure   - If the patient is on insulin, please have the PCP or endocrinologist assist with management of dosing prior to the procedure.  - NO herbal supplements or weight loss medications x 7 days prior to the procedure.  - If patient is taking Xarelto OR Eliquis then it needs to be helf for 48 hours prior to the procedure --> Should get approval from the prescribing physician (PCP or cardiologist) to hold this medication prior to the procedure.  - If the patient is taking Coumadin then it needs to be on hold Coumadin for 5 days prior to the procedure --> Should get approval from the prescribing physician (PCP or cardiologist) to hold this medication prior to the procedure.    *If the bowel prep prescribed is too expensive/not covered by the patient's insurance please pend an alternative and I will sign that order.    Thank you.

## 2024-02-01 NOTE — PROGRESS NOTES
Dr. Patel,  Please see special comments and notes. Pt Positive FIT test and declined an appt.   Called patient for scheduled telephone colon screening  Medications, pharmacy, and allergies verified with the patient.     Age 45-64 y/o (Y/N):   66-77 y/o ? Route to GI provider per rotation schedule   › GI MD preference:   › Insurance:  [place]  › Last PCP Visit:8/18/2023  › Last CBC drawn: 8/15/2023  › H/W/BMI: 6'1\" / 242 LBS / 31.93  FIT TEST: POSITIVE 1/22/2024    Special comments/notes:  Pt has a long history of IBS and Hemorrhoids. Last hemorroidectomy over 20 years ago. Pt states he still will deal with hemorrhoids intermittently with stress.  Offered pt an appt to get established but he declined. States he just needs a colonoscopy at this time.  Last colonoscopy in 2018 - normal colonoscopy but repeat in 5 years  Hx IBS, hospitalized for a GI bleed at 1985,  Telephone colon screening Questionnaires:  Yes No   Are you currently experiencing any new GI symptoms? [] [x]   If yes, symptom details:     Rectal Bleeding with or without bowel movements: [] [x]   Black stool: [] [x]   Dysphagia &Food feeling/getting stuck: [] [x]   Intractable Vomiting: [] [x]   Unexplained weight loss: [] [x]   First colonoscopy? [] [x]   Family history of colon cancer? [] [x]   Any issues with anesthesia? [] [x]   If yes, explain details:      Personal history of Resp. Issues/Oxygen Use/ISHAAN/COPD? [] [x]   If yes, CPAP/BiPAP? [] [x]   History of devices Pacemaker/Defibrillator/Stents? [] [x]   History of Cardiac/CVA issues/(MI/Stroke):   TIA in 1985 - L side hemiparesis that resolved in 48hrs [x] []     Medication usage:  Yes  No   Anticoagulants:  Anticoagulant (Except Aspirin) ? Route to RN staff to obtain ordering provider orders [] [x]   Diabetic Meds:   PO DM Meds ? Hold day prior and day of procedure  Insulin ? Route to RN clinical staff to obtain provider orders  [] [x]   Weight loss meds (phentermine/Vyvanse/Saxsenda):  [] [x]   Iron/Herbal/Multivitamin Supplement (RX/OTC): [] [x]   Usage of marijuana, CBD &/or vape products: [] [x]

## 2024-02-09 NOTE — PROGRESS NOTES
Attn: PPD Prior Auth    Patient is scheduled for Colonoscopy 32155 on 2/15/2024. Please obtain prior authorization if needed.     Thanks!

## 2024-02-09 NOTE — PROGRESS NOTES
Scheduled for:  Colonoscopy 57154  Provider Name:    Date:  2/15/2024  Location:  [facility]  Sedation:  MAC  Time:  1:30 pm, (pt is aware to arrive at 12:30 pm)  Prep:  Golytely  Meds/Allergies Reconciled?: Physician reviewed   Diagnosis with codes:  Positive Fit Test R19.5  Was patient informed to call insurance with codes (Y/N):  Yes   Referral sent?: Referral was sent at the time of electronic surgical scheduling.   EM or [facility] notified?:  I sent an electronic request to Endo Scheduling and received a confirmation today.  Medication Orders:  Pt is aware to NOT take iron pills, herbal meds and diet supplements for 7 days before exam. Also to NOT take any form of alcohol, recreational drugs and any forms of ED meds 24 hours before exam.   Misc Orders:  N/A     Further instructions given by staff: I discussed the prep instructions with the patient which he verbally understood and is aware that I will send prep instructions today via [employer].

## 2024-02-10 NOTE — PAT NURSING NOTE
Patient instructed to hold Sildenafil 3 days prior to procedure per the Pre-surgical testing policy.

## 2024-02-15 ENCOUNTER — HOSPITAL ENCOUNTER (OUTPATIENT)
Age: 59
Setting detail: HOSPITAL OUTPATIENT SURGERY
Discharge: HOME | End: 2024-02-15
Attending: INTERNAL MEDICINE | Admitting: INTERNAL MEDICINE
Payer: COMMERCIAL

## 2024-02-15 ENCOUNTER — ANESTHESIA EVENT (OUTPATIENT)
Dept: ENDOSCOPY | Age: 59
End: 2024-02-15
Payer: COMMERCIAL

## 2024-02-15 ENCOUNTER — ANESTHESIA (OUTPATIENT)
Dept: ENDOSCOPY | Age: 59
End: 2024-02-15
Payer: COMMERCIAL

## 2024-02-15 VITALS
HEIGHT: 73 IN | WEIGHT: 245 LBS | RESPIRATION RATE: 16 BRPM | OXYGEN SATURATION: 94 % | HEART RATE: 73 BPM | BODY MASS INDEX: 32.47 KG/M2 | SYSTOLIC BLOOD PRESSURE: 117 MMHG | DIASTOLIC BLOOD PRESSURE: 71 MMHG

## 2024-02-15 DIAGNOSIS — Z12.11 SCREENING FOR COLON CANCER: ICD-10-CM

## 2024-02-15 PROCEDURE — 88305 TISSUE EXAM BY PATHOLOGIST: CPT | Performed by: INTERNAL MEDICINE

## 2024-02-15 RX ORDER — SODIUM CHLORIDE, SODIUM LACTATE, POTASSIUM CHLORIDE, CALCIUM CHLORIDE 600; 310; 30; 20 MG/100ML; MG/100ML; MG/100ML; MG/100ML
INJECTION, SOLUTION INTRAVENOUS CONTINUOUS
Status: DISCONTINUED | OUTPATIENT
Start: 2024-02-15 | End: 2024-02-15

## 2024-02-15 RX ORDER — LIDOCAINE HYDROCHLORIDE 10 MG/ML
INJECTION, SOLUTION EPIDURAL; INFILTRATION; INTRACAUDAL; PERINEURAL AS NEEDED
Status: DISCONTINUED | OUTPATIENT
Start: 2024-02-15 | End: 2024-02-15 | Stop reason: SURG

## 2024-02-15 RX ADMIN — SODIUM CHLORIDE, SODIUM LACTATE, POTASSIUM CHLORIDE, CALCIUM CHLORIDE: 600; 310; 30; 20 INJECTION, SOLUTION INTRAVENOUS at 13:00:00

## 2024-02-15 RX ADMIN — LIDOCAINE HYDROCHLORIDE 50 MG: 10 INJECTION, SOLUTION EPIDURAL; INFILTRATION; INTRACAUDAL; PERINEURAL at 13:00:00

## 2024-02-15 NOTE — ANESTHESIA PREPROCEDURE EVALUATION
Anesthesia PreOp Note    HPI:     Wilian King is a 58 year old male who presents for preoperative consultation requested by: Rosalina Gay MD    Date of Surgery: 2/15/2024    Procedure(s):  COLONOSCOPY  Indication: Screening for colon cancer    Relevant Problems   No relevant active problems       NPO:  Last Liquid Consumption Date: 02/15/24  Last Liquid Consumption Time: 0930  Last Solid Consumption Date: 02/14/24  Last Solid Consumption Time: 1200  Last Liquid Consumption Date: 02/15/24          History Review:  Patient Active Problem List    Diagnosis Date Noted    Irritable bowel syndrome 07/26/2021    Shellfish allergy 07/26/2021       Past Medical History:   Diagnosis Date    H/O vasectomy     1995; reversal [id number]    IBS (irritable bowel syndrome)        Past Surgical History:   Procedure Laterality Date    HEMORRHOIDECTOMY      1985; interal hemorrhoids    HERNIA SURGERY      INGUINAL HERNIA REPAIR Bilateral 1991    TONSILLECTOMY         Medications Prior to Admission   Medication Sig Dispense Refill Last Dose    cyclobenzaprine 5 MG Oral Tab Take 1 tablet (5 mg total) by mouth nightly. 30 tablet 0     acyclovir 400 MG Oral Tab Take 1 tablet (400 mg total) by mouth 3 (three) times daily. For 3 to 4 days as directed. Complete fasting labs for future refills 30 tablet 0     citalopram 10 MG Oral Tab TAKE 1 TABLET BY MOUTH EVERY DAY 90 tablet 3 2/14/2024    fluticasone propionate 50 MCG/ACT Nasal Suspension 1 spray by Nasal route daily.       sildenafil 20 MG Oral Tab Take 0.5 tablets (10 mg total) by mouth daily as needed. 30 tablet 1     EPINEPHrine 0.3 MG/0.3ML Injection Solution Auto-injector Inject 0.3 mL (1 each total) into the muscle See Admin Instructions. Use as directed 1 each 3     dicyclomine 20 MG Oral Tab Take 1 tablet (20 mg total) by mouth 4 (four) times daily before meals and nightly. 120 tablet 3     polyethylene glycol, PEG 3350-KCl-NaBcb-NaCl-NaSulf, 236 g Oral Recon Soln Take 4,000  mL by mouth As Directed. Take 2,000 mL the night before your procedure and 2,000 mL the morning of your procedure. 1 each 0     hydrocortisone 2.5 % External Cream Place 1 Application rectally 2 (two) times daily. 1 each 3      Current Facility-Administered Medications Ordered in Epic   Medication Dose Route Frequency Provider Last Rate Last Admin    lactated ringers infusion   Intravenous Continuous Rosalina Gay MD         No current [facility]-ordered outpatient medications on file.       Allergies   Allergen Reactions    Penicillins HIVES    Shellfish-Derived Products ANAPHYLAXIS       Family History   Problem Relation Age of Onset    Heart Disorder Father     Cancer Father 51        Prostate Cancer, treated with seeds    Psychiatric Mother         Anxiety, Depression    Heart Disorder Maternal Grandfather     Cancer Paternal Grandmother         Stomach cancer    Heart Disorder Paternal Grandfather      Social History     Socioeconomic History    Marital status: Single   Tobacco Use    Smoking status: Never    Smokeless tobacco: Never   Vaping Use    Vaping Use: Never used   Substance and Sexual Activity    Alcohol use: Yes     Comment: social; very rare    Drug use: Never       Available pre-op labs reviewed.             Vital Signs:  Body mass index is 32.32 kg/m².   height is 1.854 m (6' 1\") and weight is 111.1 kg (245 lb).   Vitals:    02/10/24 0901   Weight: 111.1 kg (245 lb)   Height: 1.854 m (6' 1\")        Anesthesia Evaluation     Patient summary reviewed and Nursing notes reviewed    No history of anesthetic complications   Airway   Mallampati: II  TM distance: >3 FB  Neck ROM: full  Dental - Dentition appears grossly intact     Pulmonary - negative ROS and normal exam   Cardiovascular - negative ROS and normal exam  Exercise tolerance: good    ECG reviewed    Neuro/Psych - negative ROS     GI/Hepatic/Renal    (+) bowel prep    Endo/Other - negative ROS   Abdominal                  Anesthesia Plan:   ASA:   2  Plan:   MAC  Plan Comments: I have discussed the anesthetic plan, major risks and alternatives with the patient and answered all questions. The patient desires to proceed with surgery and anesthesia as planned.     Informed Consent Plan and Risks Discussed With:  Patient      I have informed Wilian King and/or legal guardian or family member of the nature of the anesthetic plan, benefits, risks including possible dental damage if relevant, major complications, and any alternative forms of anesthetic management.   All of the patient's questions were answered to the best of my ability. The patient desires the anesthetic management as planned.  Royal Le MD  2/15/2024 12:28 PM  Present on Admission:  **None**

## 2024-02-15 NOTE — ANESTHESIA POSTPROCEDURE EVALUATION
Patient: Wilian King    Procedure Summary       Date: 02/15/24 Room / Location: [facility] ENDOSCOPY 01 / [facility] ENDO    Anesthesia Start: 1300 Anesthesia Stop: 1327    Procedure: COLONOSCOPY Diagnosis:       Screening for colon cancer      (polyps, hemorrhoids)    Surgeons: Rosalina Gay MD Anesthesiologist: Royal Le MD    Anesthesia Type: MAC ASA Status: 2            Anesthesia Type: MAC    Vitals Value Taken Time   /73 02/15/24 1326   Temp  02/15/24 1327   Pulse 73 02/15/24 1326   Resp 17 02/15/24 1326   SpO2 93 % 02/15/24 1326   Vitals shown include unfiled device data.    [facility] AN Post Evaluation:   Patient Evaluated in PACU  Patient Participation: complete - patient participated  Level of Consciousness: sleepy but conscious  Pain Management: adequate  Airway Patency:patent  Yes    Nausea/Vomiting: none  Cardiovascular Status: acceptable  Respiratory Status: acceptable and room air  Postoperative Hydration acceptable      Royal Le MD  2/15/2024 1:27 PM

## 2024-02-15 NOTE — OPERATIVE REPORT
COLONOSCOPY REPORT    Wilian King     10/2/1965 Age 58 year old   PCP Edgar Lala MD Endoscopist Rosalina Gay MD     Date of procedure: 02/15/24    Procedure: Colonoscopy w/cold biopsy and cold snare polypectomy    Pre-operative diagnosis: +FIT, screening colonoscopy    Post-operative diagnosis: polyps and hemorrhoids, anal papilla with irregular tissue    Medications: MAC sedation    Withdrawal time: 15 minutes    Procedure:  Informed consent was obtained from the patient after the risks of the procedure were discussed, including but not limited to bleeding, perforation, aspiration, infection, or possibility of a missed lesion. After discussions of the risks/benefits and alternatives to this procedure, as well as the planned sedation, the patient was placed in the left lateral decubitus position and begun on continuous blood pressure pulse oximetry and EKG monitoring and this was maintained throughout the procedure. Once an adequate level of sedation was obtained a digital rectal exam was completed. Then the lubricated tip of the [id number] diagnostic video colonoscope was inserted and advanced without difficulty to the cecum using the CO2 insufflation technique. The cecum was identified by localizing the trifold, the appendix and the ileocecal valve. Withdrawal was begun with thorough washing and careful examination of the colonic walls and folds. A routine second examination of the cecum/ascending colon was performed. Photodocumentation was obtained. The bowel prep was good. Views of the colon were excellent with washing. I then carefully withdrew the instrument from the patient who tolerated the procedure well.     Complications: none.    Findings:   1. 5 polyp(s) noted as follows:      A. 3-4 mm polyps x2 in the transverse colon; flat morphology; cold biopsy polypectomy and retrieved.      B. 3-4 mm polyps x2 in the descending colon; flat morphology; cold biopsy polypectomy and retrieved.       C. 6 mm polyp in the descending colon; flat morphology; cold snare polypectomy and retrieved.    2. Diverticulosis: none appreciated.    3. Terminal ileum: the visualized mucosa appeared normal.    4. A retroflexed view of the rectum revealed hemorrhoids and one prolapsing likely anal papillae but irregular tissue distally not biopsied given risk of pain and bleeding.    5. The colonic mucosa throughout the colon showed normal vascular pattern, without evidence of angioectasias or inflammation.     6. CAMILA: normal rectal tone, no masses palpated.     Recommend:  Await pathology, likely repeat colonoscopy in 3 years. The interval for the next colonoscopy will be determined after reviewing pathology. If new signs or symptoms develop, colonoscopy may need to be repeated sooner.   High fiber diet.  Monitor for blood in the stool. If having more than just tinge of blood, call office or go to the ER.  Have hemorrhoids/anal papillae evaluated by surgery    >>>If tissue was obtained and you have not received your pathology results either by phone or letter within 2 weeks, please call our office at [phone].    Specimens: transverse and descending polyps

## 2024-02-15 NOTE — DISCHARGE INSTRUCTIONS
Home Care Instructions for Colonoscopy with Sedation    Diet:  - Resume your regular diet as tolerated unless otherwise instructed.  - Start with light meals to minimize bloating.  - Do not drink alcohol today.    Medication:  - If you have questions about resuming your normal medications, please contact your Primary Care Physician.    Activities:  - Take it easy today. Do not return to work today.  - Do not drive today.  - Do not operate any machinery today (including kitchen equipment).    Colonoscopy:  - You may notice some rectal \"spotting\" (a little blood on the toilet tissue) for a day or two after the exam. This is normal.  - If you experience any rectal bleeding (not spotting), persistent tenderness or sharp severe abdominal pains, oral temperature over 100 degrees Fahrenheit, light-headedness or dizziness, or any other problems, contact your doctor.    **If unable to reach your doctor, please go to the [facility] Emergency Room**    - Your referring physician will receive a full report of your examination.  - If you do not hear from your doctor's office within two weeks of your biopsy, please call them for your results.    You may be able to see your laboratory results in [employer] between 4 and 7 business days.  In some cases, your physician may not have viewed the results before they are released to [employer].  If you have questions regarding your results contact the physician who ordered the test/exam by phone or via [employer] by choosing \"Ask a Medical Question.\"

## 2024-02-15 NOTE — H&P
Pre Procedure History & Physical Examination    Patient Name: Wilian King  MRN: [id number]  [facility]: [id number]  YOB: 1965    Diagnosis: +FIT test and screening colonoscopy    Medications Prior to Admission   Medication Sig Dispense Refill Last Dose    cyclobenzaprine 5 MG Oral Tab Take 1 tablet (5 mg total) by mouth nightly. 30 tablet 0     acyclovir 400 MG Oral Tab Take 1 tablet (400 mg total) by mouth 3 (three) times daily. For 3 to 4 days as directed. Complete fasting labs for future refills 30 tablet 0     citalopram 10 MG Oral Tab TAKE 1 TABLET BY MOUTH EVERY DAY 90 tablet 3 2/14/2024    fluticasone propionate 50 MCG/ACT Nasal Suspension 1 spray by Nasal route daily.       sildenafil 20 MG Oral Tab Take 0.5 tablets (10 mg total) by mouth daily as needed. 30 tablet 1     EPINEPHrine 0.3 MG/0.3ML Injection Solution Auto-injector Inject 0.3 mL (1 each total) into the muscle See Admin Instructions. Use as directed 1 each 3     dicyclomine 20 MG Oral Tab Take 1 tablet (20 mg total) by mouth 4 (four) times daily before meals and nightly. 120 tablet 3     polyethylene glycol, PEG 3350-KCl-NaBcb-NaCl-NaSulf, 236 g Oral Recon Soln Take 4,000 mL by mouth As Directed. Take 2,000 mL the night before your procedure and 2,000 mL the morning of your procedure. 1 each 0     hydrocortisone 2.5 % External Cream Place 1 Application rectally 2 (two) times daily. 1 each 3      Current Facility-Administered Medications   Medication Dose Route Frequency    lactated ringers infusion   Intravenous Continuous     Facility-Administered Medications Ordered in Other Encounters   Medication Dose Route Frequency    lidocaine PF (Xylocaine-MPF) 1% injection   Intravenous PRN       Allergies:   Allergies   Allergen Reactions    Penicillins HIVES    Shellfish-Derived Products ANAPHYLAXIS       Past Medical History:   Diagnosis Date    H/O vasectomy     1995; reversal [id number]    IBS (irritable bowel syndrome)      Past Surgical  History:   Procedure Laterality Date    HEMORRHOIDECTOMY      1985; interal hemorrhoids    HERNIA SURGERY      INGUINAL HERNIA REPAIR Bilateral 1991    TONSILLECTOMY       Family History   Problem Relation Age of Onset    Heart Disorder Father     Cancer Father 51        Prostate Cancer, treated with seeds    Psychiatric Mother         Anxiety, Depression    Heart Disorder Maternal Grandfather     Cancer Paternal Grandmother         Stomach cancer    Heart Disorder Paternal Grandfather      Social History     Tobacco Use    Smoking status: Never    Smokeless tobacco: Never   Substance Use Topics    Alcohol use: Yes     Comment: social; very rare         ROS:   CONSTITUTIONAL:  negative for fevers, rigors  EYES:  negative for diplopia   RESPIRATORY:  negative for severe shortness of breath  CARDIOVASCULAR:  negative for crushing sub-sternal chest pain  GASTROINTESTINAL:  see HPI  GENITOURINARY:  negative for dysuria or gross hematuria  INTEGUMENT/BREAST:  SKIN:  negative for jaundice   ALLERGIC/IMMUNOLOGIC:  negative for hay fever  ENDOCRINE:  negative for cold intolerance and heat intolerance  MUSCULOSKELETAL:  negative for joint effusion/severe erythema  BEHAVIOR/PSYCH:  negative for psychotic behavior      PHYSICAL EXAM:   /71 (BP Location: Left arm)   Pulse 74   Resp 16   Ht 6' 1\" (1.854 m)   Wt 245 lb (111.1 kg)   SpO2 97%   BMI 32.32 kg/m²       Gen: Patient appears comfortable and in no acute discomfort  HEENT: the sclera appears anicteric, oropharynx clear, mucus membranes appear moist  CV: regular rate and rhythm, the extremities are warm and well perfused   Lung: Moves air well; No labored breathing  Abdomen: soft, non-tender exam in all quadrants without rigidity or guarding, non-distended, no abnormal bowel sounds noted, no masses are palpated  Skin: No jaundice  Ext: no cyanosis, clubbing or edema is evident.   Neuro: Alert and interactive, and gross movements of extremities normal    I have  discussed the risks and benefits and alternatives of the procedure with the patient/family.  They understand and agree to proceed with plan of care.   I have reviewed recent H&P/clinic notes  Rosalina Gay MD  [facility] - Gastroenterology  2/15/2024  1:02 PM

## 2024-02-16 ENCOUNTER — PATIENT MESSAGE (OUTPATIENT)
Dept: INTERNAL MEDICINE CLINIC | Facility: CLINIC | Age: 59
End: 2024-02-16

## 2024-02-16 NOTE — PATIENT INSTRUCTIONS
You were seen in clinic today for follow-up.  Today, we did review your recent colonoscopy with Dr. Gay.  There were fragments of tubular adenoma that are noncancerous pieces of tissue that should be monitored within 3 years as recommended.  - There is an incidental finding of possible AIN or anal intraepithelial neoplasia, potentially abnormal cells that we will need to be evaluated further with surgery  - We will obtain a CT scan of the abdomen and pelvis in the meantime  - Continue with good fiber intake, use of dicyclomine as needed for normal, solid formed bowel movements as best we can  - Please see surgery as scheduled with Dr. GUPTA to determine need for possible surgical resection.uu     We are repeating cholesterol levels and fasting blood work -- this can wait until you have your next few appointments completed    We are very happy to hear that you are worklife balance continues to be well-controlled.  Continue managing with therapy and medications in place    Continue with good stress relief techniques    Return to clinic in 6 months for follow-up

## 2024-02-16 NOTE — PROGRESS NOTES
Wilian King is a 58 year old male.    HPI:     Chief Complaint   Patient presents with    Checkup     6 month, discuss results of colonoscopy     Mr. KING is a 58 year old male PMHX irritable bowel syndrome, anxiety coming in for follow-up.    He is doing well. He is concerned about AIN in the anal area. Still with irritable syndrome. Some intermittent hematochezia but known hemorrhoids. 4-5 stools per day. Loose stools. He may feels this is flaring up.  Has had history of hemorrhoidectomy in the past at age 20.     Food and water intake. Work has been stressful more recently.  Still seeing his therapist. He is working well with his staff.     No weight loss - some stress eating.     HISTORY:  Past Medical History:   Diagnosis Date    H/O vasectomy     1995; reversal [id number]    IBS (irritable bowel syndrome)       Past Surgical History:   Procedure Laterality Date    COLONOSCOPY N/A 2/15/2024    Procedure: COLONOSCOPY;  Surgeon: Rosalina Gay MD;  [facility]: [facility] ENDO    HEMORRHOIDECTOMY      1985; interal hemorrhoids    HERNIA SURGERY      INGUINAL HERNIA REPAIR Bilateral 1991    TONSILLECTOMY        Family History   Problem Relation Age of Onset    Heart Disorder Father     Cancer Father 51        Prostate Cancer, treated with seeds    Psychiatric Mother         Anxiety, Depression    Heart Disorder Maternal Grandfather     Cancer Paternal Grandmother         Stomach cancer    Heart Disorder Paternal Grandfather       Social History:   Social History     Socioeconomic History    Marital status: Single   Tobacco Use    Smoking status: Never    Smokeless tobacco: Never   Vaping Use    Vaping Use: Never used   Substance and Sexual Activity    Alcohol use: Yes     Comment: social; very rare    Drug use: Never        Medications (Active prior to today's visit):  Current Outpatient Medications   Medication Sig Dispense Refill    cyclobenzaprine 5 MG Oral Tab Take 1 tablet (5 mg total) by mouth nightly.  (Patient taking differently: Take 1 tablet (5 mg total) by mouth nightly as needed.) 30 tablet 0    acyclovir 400 MG Oral Tab Take 1 tablet (400 mg total) by mouth 3 (three) times daily. For 3 to 4 days as directed. Complete fasting labs for future refills 30 tablet 0    citalopram 10 MG Oral Tab TAKE 1 TABLET BY MOUTH EVERY DAY 90 tablet 3    fluticasone propionate 50 MCG/ACT Nasal Suspension 1 spray by Nasal route daily.      sildenafil 20 MG Oral Tab Take 0.5 tablets (10 mg total) by mouth daily as needed. 30 tablet 1    EPINEPHrine 0.3 MG/0.3ML Injection Solution Auto-injector Inject 0.3 mL (1 each total) into the muscle See Admin Instructions. Use as directed 1 each 3    dicyclomine 20 MG Oral Tab Take 1 tablet (20 mg total) by mouth 4 (four) times daily before meals and nightly. (Patient taking differently: Take 1 tablet (20 mg total) by mouth 4 (four) times daily as needed.) 120 tablet 3    hydrocortisone 2.5 % External Cream Place 1 Application rectally 2 (two) times daily. 1 each 3       Allergies:  Allergies   Allergen Reactions    Penicillins HIVES    Shellfish-Derived Products ANAPHYLAXIS         ROS:   Positive Findings indicated in BOLD    Constitutional: Fever, Chills, Weight Gain, Weight Loss, Night Sweats, Fatigue, Malaise  ENT/Mouth:  Hearing Changes, Ear Pain, Nasal Congestion, Sinus Pain, Hoarseness, Sore throat, Rhinorrhea, Swallowing Difficulty  Eyes: Eye Pain, Swelling, Redness, Foreign Body, Discharge, Vision Changes  Cardiovascular: Chest Pain, SOB, PND, Dyspnea on Exertion, Orthopnea, Claudication, Edema, Palpitations  Respiratory: Cough, Sputum, Wheezing, Shortness of breath  Gastrointestinal: Nausea, Vomiting, Diarrhea, Constipation, Pain, Heartburn, Dysphagia, Bloody stools, Tarry stools  Genitourinary: Dysmenorrhea, Dysuria, Urinary Frequency, Hematuria, Urinary Incontinence, Urgency,  Flank Pain  Musculoskeletal: Arthralgias, Myalgias, Joint Swelling, Joint Stiffness, Back Pain, Neck  Pain  Integumentary: Skin Lesions, Pruritis, Hair Changes, Jaundice, Nail changes  Neuro: Weakness, Numbness, Paresthesias, Loss of Consciousness, Syncope, Dizziness, Headache, Falls  Psych: Anxiety, Depression, Insomnia, Suicidal Ideation, Homicidal ideation, Memory Changes  Heme/Lymph: Bruising, Bleeding, Lymphadenopathy  Endocrine: Polyuria, Polydipsia, Temperature Intolerance    PHYSICAL EXAM:   Vital Signs:  Blood pressure 116/76, pulse 52, temperature 98.1 °F (36.7 °C), height 6' 1\" (1.854 m), weight 253 lb (114.8 kg), SpO2 97%.     Constitutional: No acute distress. Alert and oriented x 3.  Eyes: EOMI, PERRLA, clear sclera b/l  HENT: NCAT, Moist mucous membranes, Oropharynx without erythema or exudates  Neck: No JVD, no thyromegaly  Cardiovascular: S1, S2, no S3, no S4, Regular rate and rhythm, No murmurs/gallops/rubs.   Vascular: Equal pulses 2+ carotids no bruits or thrills/radial/DP/PT bilaterally  Respiratory: Clear to auscultation bilaterally.  No wheezes/rales/rhonchi  Gastrointestinal: Soft, nontender, nondistended. Positive bowel sounds x 4. No rebound tenderness. No hepatomegaly, No splenomegaly  Genitourinary: No CVA tenderness bilaterally  Neurologic: No focal neurological deficits, CN II-XII intact, MSK Strength 5/5 and symmetric in all extremities, normal gait, 2+ patellar tendon, bilateral toe proprioception intact  + Mild decreased cold temperature and light touch left worse than right  Musculoskeletal: Full range of motion of all extremities, no clubbing/swelling/edema  Skin: No lesions, No erythema, no jaundice, Cap Refill < 2s;  Psychiatric: Appropriate mood and affect  Heme/Lymph/Immune: No cervical LAD      DATA REVIEWED   Labs:  Recent Results (from the past 8760 hour(s))   CBC W/ DIFFERENTIAL    Collection Time: 08/15/23  7:34 AM   Result Value Ref Range    WBC 5.6 4.0 - 11.0 x10(3) uL    RBC 5.28 4.30 - 5.70 x10(6)uL    HGB 15.4 13.0 - 17.5 g/dL    HCT 46.1 39.0 - 53.0 %    MCV 87.3  80.0 - 100.0 fL    MCH 29.2 26.0 - 34.0 pg    MCHC 33.4 31.0 - 37.0 g/dL    RDW-SD 40.1 35.1 - 46.3 fL    RDW 12.5 11.0 - 15.0 %    .0 150.0 - 450.0 10(3)uL    Neutrophil Absolute Prelim 3.19 1.50 - 7.70 x10 (3) uL    Neutrophil Absolute 3.19 1.50 - 7.70 x10(3) uL    Lymphocyte Absolute 1.64 1.00 - 4.00 x10(3) uL    Monocyte Absolute 0.46 0.10 - 1.00 x10(3) uL    Eosinophil Absolute 0.20 0.00 - 0.70 x10(3) uL    Basophil Absolute 0.05 0.00 - 0.20 x10(3) uL    Immature Granulocyte Absolute 0.02 0.00 - 1.00 x10(3) uL    Neutrophil % 57.3 %    Lymphocyte % 29.5 %    Monocyte % 8.3 %    Eosinophil % 3.6 %    Basophil % 0.9 %    Immature Granulocyte % 0.4 %     *Note: Due to a large number of results and/or encounters for the requested time period, some results have not been displayed. A complete set of results can be found in Results Review.       Recent Results (from the past 8760 hour(s))   Comp Metabolic Panel (14)    Collection Time: 08/15/23  7:34 AM   Result Value Ref Range    Glucose 95 70 - 99 mg/dL    Sodium 141 136 - 145 mmol/L    Potassium 4.4 3.5 - 5.1 mmol/L    Chloride 109 98 - 112 mmol/L    CO2 27.0 21.0 - 32.0 mmol/L    Anion Gap 5 0 - 18 mmol/L    BUN 14 7 - 18 mg/dL    Creatinine 1.01 0.70 - 1.30 mg/dL    BUN/CREA Ratio 13.9 10.0 - 20.0    Calcium, Total 9.1 8.5 - 10.1 mg/dL    Calculated Osmolality 292 275 - 295 mOsm/kg    eGFR-Cr 87 >=60 mL/min/1.73m2    ALT 28 16 - 61 U/L    AST 15 15 - 37 U/L    Alkaline Phosphatase 56 45 - 117 U/L    Bilirubin, Total 1.3 0.1 - 2.0 mg/dL    Total Protein 7.1 6.4 - 8.2 g/dL    Albumin 3.9 3.4 - 5.0 g/dL    Globulin  3.2 2.8 - 4.4 g/dL    A/G Ratio 1.2 1.0 - 2.0    Patient Fasting for CMP? Yes      *Note: Due to a large number of results and/or encounters for the requested time period, some results have not been displayed. A complete set of results can be found in Results Review.       Cholesterol, Total (mg/dL)   Date Value   08/15/2023 168     HDL  Cholesterol (mg/dL)   Date Value   08/15/2023 42     LDL Cholesterol (mg/dL)   Date Value   08/15/2023 115 (H)     Triglycerides (mg/dL)   Date Value   08/15/2023 58       Last A1c value was 5.5% done 8/15/2023.        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 Gay fragments of tubular adenoma x 5 noted, for which repeat colonoscopy is recommended in 3 years  - There was concern for possible AIN that is connected to an internal hemorrhoid along the rectal canal.  - This was not seen on the prior colonoscopy report from 2018.  - We will proceed with CT abdomen and pelvis with contrast as we wait surgical evaluation with Dr. David.     Anxiety  PTSD  Recall that the patient.  Had been struggling with work-related stress resulting in anxiety, depression, insomnia.  He is showed significant improvement by working with a therapist, medical management, and focusing on himself. Specifically, he is worked on getting more assistance at work, trying to not overextend himself by taking on multiple projects.  It seems his work has been responsive on making positive changes, namely an  that he enjoys working with.  - Outside of work, he has been receiving good support from his family.  He is taking time for leisure activities such as traveling and reading recreationally.  - Staying active with exercise  - Continues to maintain good control of her symptoms.  Focusing on self-care, enjoying his daughter's wedding, and has a more supportive team at work.  He is achieving his work goals and overall thriving since our last visit     Hyperlipidemia  ASCVD 4.5%, borderline need for statin,   - Continue with good optimization of nutrition  - Exercising as tolerated  -Repeat lipid panel    Vitamin D deficiency  Mildly deficient  - Recommended increasing to 4000 units of  vitamin D3 over-the-counter, may help with symptoms of anxiety     Paresthesias  Chronic, intermittent.  Does have some intermittent back pain.  Noticeable decrease in vibration sense worse on the right side, cool temperature/light touch worse on the left side.  Broad differential, should rule out metabolic disorder such as thyroid disease, diabetes.  I favor possible spondylosis with impingement of the sciatic nerves bilaterally  -  Secondary work-up largely unremarkable  - X-ray lumbar spine did show degenerative disc and facet disease throughout the lumbar spine most prominent at L4-L5 and L5-S1  - Seems to come and go, pending work-up we may consider neuropathic medication such as gabapentin versus Lyrica versus Cymbalta  -Can also consider EMG if symptoms are worsening.  - Symptoms seem to be improving, now just affecting the toes rather than the whole foot.     IBS  Diarrhea predominant.  .  Intermittent hematochezia that is self-limited  -Can start a food log and determine any triggering factors  - On dicyclomine as needed  -Still seems to be controlled but controlled on dicyclomine as needed     Elevated PSA  PSA 4.76; reported biopsies were benign  -PSA down to 2.00     Internal hemorrhoids  2 noted on examination today, FOBT negative  - Continue high-fiber intake, try to control IBS  - Check FIT Test: negative 8/12/2022     Onychomycosis  - We will perform trial of Lamisil first; some improvement since last visit.  We will continue with his current management      HSV-1 infection  - Refilled acyclovir on as-needed basis on this visit     Shellfish allergy  - Has PRN EpiPen     Erectile dysfunction  - Currently on sildenafil, 10 mg/half tablet once a day.         Orders This Visit:  No orders of the defined types were placed in this encounter.      Meds This Visit:  Requested Prescriptions      No prescriptions requested or ordered in this encounter       Imaging & Referrals:  [facility]  Maintenance  Due for shingles vaccine series, COVID dose #3    Spent 30 minutes obtaining history, evaluating patient, discussing treatment options, diet, exercise, review of available labs and radiology reports, and completing documentation.       Return to clinic in 6 months for annual physical examination    Edgar Lala MD, 02/19/24, 11:02 AM

## 2024-02-16 NOTE — TELEPHONE ENCOUNTER
From: Wilian King  To: Edgar Lala  Sent: 2/16/2024 2:08 PM CST  Subject: Colonoscopy finding AIN    I had to make an appointment with a general surgeon to have possible AIN removed. I will bring the picture and results with me on Monday, but would like to discuss as I am extremely concerned Current gen surg visit is 3/12 and I am on their cancellation list.

## 2024-02-19 ENCOUNTER — OFFICE VISIT (OUTPATIENT)
Dept: INTERNAL MEDICINE CLINIC | Facility: CLINIC | Age: 59
End: 2024-02-19

## 2024-02-19 ENCOUNTER — TELEPHONE (OUTPATIENT)
Dept: INTERNAL MEDICINE CLINIC | Facility: CLINIC | Age: 59
End: 2024-02-19

## 2024-02-19 ENCOUNTER — TELEPHONE (OUTPATIENT)
Facility: CLINIC | Age: 59
End: 2024-02-19

## 2024-02-19 VITALS
TEMPERATURE: 98 F | BODY MASS INDEX: 33.53 KG/M2 | WEIGHT: 253 LBS | HEART RATE: 52 BPM | HEIGHT: 73 IN | OXYGEN SATURATION: 97 % | SYSTOLIC BLOOD PRESSURE: 116 MMHG | DIASTOLIC BLOOD PRESSURE: 76 MMHG

## 2024-02-19 DIAGNOSIS — K62.82 AIN (ANAL INTRAEPITHELIAL NEOPLASIA) ANAL CANAL: ICD-10-CM

## 2024-02-19 DIAGNOSIS — Z12.11 SCREENING FOR COLON CANCER: ICD-10-CM

## 2024-02-19 DIAGNOSIS — B00.9 HSV-1 INFECTION: ICD-10-CM

## 2024-02-19 DIAGNOSIS — R20.2 BILATERAL LEG PARESTHESIA: ICD-10-CM

## 2024-02-19 DIAGNOSIS — E78.5 BORDERLINE HYPERLIPIDEMIA: ICD-10-CM

## 2024-02-19 DIAGNOSIS — Z56.6 WORK-RELATED STRESS: ICD-10-CM

## 2024-02-19 DIAGNOSIS — K58.9 IRRITABLE BOWEL SYNDROME, UNSPECIFIED TYPE: ICD-10-CM

## 2024-02-19 DIAGNOSIS — R20.2 PARESTHESIAS: ICD-10-CM

## 2024-02-19 DIAGNOSIS — E55.9 VITAMIN D DEFICIENCY: ICD-10-CM

## 2024-02-19 DIAGNOSIS — F41.9 ANXIETY: ICD-10-CM

## 2024-02-19 DIAGNOSIS — R97.20 ELEVATED PSA: ICD-10-CM

## 2024-02-19 DIAGNOSIS — R19.5 POSITIVE FIT (FECAL IMMUNOCHEMICAL TEST): Primary | ICD-10-CM

## 2024-02-19 PROCEDURE — 99214 OFFICE O/P EST MOD 30 MIN: CPT | Performed by: INTERNAL MEDICINE

## 2024-02-19 PROCEDURE — 3078F DIAST BP <80 MM HG: CPT | Performed by: INTERNAL MEDICINE

## 2024-02-19 PROCEDURE — 3008F BODY MASS INDEX DOCD: CPT | Performed by: INTERNAL MEDICINE

## 2024-02-19 PROCEDURE — 3074F SYST BP LT 130 MM HG: CPT | Performed by: INTERNAL MEDICINE

## 2024-02-19 RX ORDER — ACYCLOVIR 400 MG/1
400 TABLET ORAL 3 TIMES DAILY
Qty: 30 TABLET | Refills: 0 | Status: SHIPPED | OUTPATIENT
Start: 2024-02-19

## 2024-02-19 RX ORDER — SILDENAFIL CITRATE 20 MG/1
10 TABLET ORAL
Qty: 30 TABLET | Refills: 1 | Status: SHIPPED | OUTPATIENT
Start: 2024-02-19

## 2024-02-19 SDOH — HEALTH STABILITY - MENTAL HEALTH: OTHER PHYSICAL AND MENTAL STRAIN RELATED TO WORK: Z56.6

## 2024-02-19 NOTE — TELEPHONE ENCOUNTER
----- Message from Rosalina Gay MD sent at 2/19/2024  9:05 AM CST -----  GI staff: please place recall for colonoscopy in 3 years

## 2024-02-19 NOTE — TELEPHONE ENCOUNTER
Recall colon in 3 years per Dr. Gay. Colonoscopy done on 2/15/24.    Health maintenance updated and message sent to pt outreach to repeat colon in 3 years.     MyChart message from MD read by pt on 2/19/24.

## 2024-02-23 ENCOUNTER — MED REC SCAN ONLY (OUTPATIENT)
Dept: INTERNAL MEDICINE CLINIC | Facility: CLINIC | Age: 59
End: 2024-02-23

## 2024-02-25 ENCOUNTER — HOSPITAL ENCOUNTER (OUTPATIENT)
Dept: CT IMAGING | Age: 59
Discharge: HOME | End: 2024-02-25
Attending: INTERNAL MEDICINE
Payer: COMMERCIAL

## 2024-02-25 ENCOUNTER — HOSPITAL ENCOUNTER (OUTPATIENT)
Dept: CT IMAGING | Age: 59
End: 2024-02-25
Attending: INTERNAL MEDICINE
Payer: COMMERCIAL

## 2024-02-25 DIAGNOSIS — R19.5 POSITIVE FIT (FECAL IMMUNOCHEMICAL TEST): ICD-10-CM

## 2024-02-25 DIAGNOSIS — K62.82 AIN (ANAL INTRAEPITHELIAL NEOPLASIA) ANAL CANAL: ICD-10-CM

## 2024-02-25 LAB
CREAT BLD-MCNC: 1.1 MG/DL
EGFRCR SERPLBLD CKD-EPI 2021: 78 ML/MIN/1.73M2 (ref 60–?)

## 2024-02-25 PROCEDURE — 82565 ASSAY OF CREATININE: CPT

## 2024-02-25 PROCEDURE — 74177 CT ABD & PELVIS W/CONTRAST: CPT | Performed by: INTERNAL MEDICINE

## 2024-02-27 ENCOUNTER — OFFICE VISIT (OUTPATIENT)
Dept: SURGERY | Facility: CLINIC | Age: 59
End: 2024-02-27
Payer: COMMERCIAL

## 2024-02-27 VITALS — BODY MASS INDEX: 33.53 KG/M2 | WEIGHT: 253 LBS | HEIGHT: 73 IN

## 2024-02-27 DIAGNOSIS — K62.9 ANAL LESION: Primary | ICD-10-CM

## 2024-02-28 ENCOUNTER — OFFICE VISIT (OUTPATIENT)
Dept: SURGERY | Facility: CLINIC | Age: 59
End: 2024-02-28
Payer: COMMERCIAL

## 2024-02-28 ENCOUNTER — TELEPHONE (OUTPATIENT)
Dept: SURGERY | Facility: CLINIC | Age: 59
End: 2024-02-28

## 2024-02-28 VITALS — WEIGHT: 253 LBS | HEIGHT: 73 IN | BODY MASS INDEX: 33.53 KG/M2

## 2024-02-28 DIAGNOSIS — K62.9 ABNORMALITY OF RECTUM: ICD-10-CM

## 2024-02-28 DIAGNOSIS — R93.5 ABNORMAL CT OF THE ABDOMEN: ICD-10-CM

## 2024-02-28 DIAGNOSIS — R93.5 ABNORMAL CT SCAN, PELVIS: ICD-10-CM

## 2024-02-28 DIAGNOSIS — K62.82 AIN (ANAL INTRAEPITHELIAL NEOPLASIA) ANAL CANAL: ICD-10-CM

## 2024-02-28 DIAGNOSIS — K64.8 HEMORRHOIDS, COMPLICATED: Primary | ICD-10-CM

## 2024-02-28 DIAGNOSIS — E27.9 ADRENAL ABNORMALITY (HCC): ICD-10-CM

## 2024-02-28 PROCEDURE — 3008F BODY MASS INDEX DOCD: CPT | Performed by: SURGERY

## 2024-02-28 PROCEDURE — 99244 OFF/OP CNSLTJ NEW/EST MOD 40: CPT | Performed by: SURGERY

## 2024-02-28 NOTE — H&P
Chief complaint:   Chief Complaint   Patient presents with    Hemorrhoids     Referred by Dr. 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 He occasionally has rectal bleeding, especially if he has several BMs. He has symptoms of IBS. He is concerned about the adrenal lesion for which MRI is recommended.     CT abd pelv 2/26/2024  CONCLUSION:  1. Nonspecific lobulated enhancing nodularity of the anal canal and enhancement of the right aspect of the more proximal anorectal region; these findings are not well assessed by CT. Correlation with endoscopic findings is suggested with potential  follow-up MRI (3T rectal protocol) if warranted on clinical grounds.     2. There is a left adrenal lesion with indeterminate attenuation characteristics. Follow-up MRI of the abdomen (adrenal protocol) with and without contrast is recommended for further assessment.     3. Otherwise, no acute intra-abdominal process is identified. No additional etiology of the patient's symptoms is appreciated from this study.     4. Hepatomegaly with underlying hepatic steatosis.       Past medical history:   Past Medical History:   Diagnosis Date    H/O vasectomy     1995; reversal [id number]    IBS (irritable bowel syndrome)        Past surgical history:   Past Surgical History:   Procedure Laterality Date    COLONOSCOPY N/A 2/15/2024    Procedure: COLONOSCOPY;  Surgeon: Rosalina Gay MD;  Location: [facility] ENDO    HEMORRHOIDECTOMY      1985; interal hemorrhoids    HERNIA SURGERY      INGUINAL HERNIA REPAIR Bilateral 1991    TONSILLECTOMY         Allergies:   Allergies   Allergen Reactions    Penicillins HIVES    Shellfish-Derived Products ANAPHYLAXIS       Medications:   Current Outpatient Medications   Medication Sig Dispense Refill    acyclovir  400 MG Oral Tab Take 1 tablet (400 mg total) by mouth 3 (three) times daily. For 3 to 4 days as directed. Complete fasting labs for future refills 30 tablet 0    sildenafil 20 MG Oral Tab Take 0.5 tablets (10 mg total) by mouth daily as needed. 30 tablet 1    cyclobenzaprine 5 MG Oral Tab Take 1 tablet (5 mg total) by mouth nightly. (Patient taking differently: Take 1 tablet (5 mg total) by mouth nightly as needed.) 30 tablet 0    citalopram 10 MG Oral Tab TAKE 1 TABLET BY MOUTH EVERY DAY 90 tablet 3    fluticasone propionate 50 MCG/ACT Nasal Suspension 1 spray by Nasal route daily.      EPINEPHrine 0.3 MG/0.3ML Injection Solution Auto-injector Inject 0.3 mL (1 each total) into the muscle See Admin Instructions. Use as directed 1 each 3    dicyclomine 20 MG Oral Tab Take 1 tablet (20 mg total) by mouth 4 (four) times daily before meals and nightly. (Patient taking differently: Take 1 tablet (20 mg total) by mouth 4 (four) times daily as needed.) 120 tablet 3    hydrocortisone 2.5 % External Cream Place 1 Application rectally 2 (two) times daily. 1 each 3       Social history:   Social History     Socioeconomic History    Marital status: Single   Tobacco Use    Smoking status: Never    Smokeless tobacco: Never   Vaping Use    Vaping Use: Never used   Substance and Sexual Activity    Alcohol use: Yes     Comment: social; very rare    Drug use: Never        Family history:  Family History   Problem Relation Age of Onset    Heart Disorder Father     Cancer Father 51        Prostate Cancer, treated with seeds    Psychiatric Mother         Anxiety, Depression    Heart Disorder Maternal Grandfather     Cancer Paternal Grandmother         Stomach cancer    Heart Disorder Paternal Grandfather         Review of Systems:   GENERAL: feels generally well  SKIN: no ulcerated or worrisome skin lesions  EYES:denies blurred vision or double vision  HEENT: denies new nasal congestion, sinus pain or ST  LUNGS: denies shortness of  breath with exertion  CARDIOVASCULAR: denies chest pain on exertion  GI: no hematemesis, no BRBPR, no worsening heartburn  : no dysuria, no blood in urine, no difficulty urinating  MUSCULOSKELETAL: no new musculoskeletal complaints  NEURO: no persistent, recurrent  headaches  PSYCHE:no depression or anxiety  HEMATOLOGIC: no hx of blood dyscrasia  ENDOCRINE: no new endocrine problems  ALL/ASTHMA: no new hx of severe allergy or asthma  BACK: normal, no spinal deformity, no CVA tenderness    Physical examination:     Constitutional: appears well hydrated alert and responsive no acute distress noted  HEENT wnl, anicteric, PERRL, normocephalic, atraumatic  Neck supple, norm ROM, no JVD  L CTA B  H Reg rate  Abd soft, NT, ND, no masses, no hernias, no HSM.  Extr no c/c/e  Rectal internal and external hemorrhoids without bleeding, radial white raised anal lesion noted, anoscopy limited exam, hemorrhoids, no rectal ulceration of obvious mass.   Skin intact, no jaundice, no rashes, no lesions  Neuro grossly intact, no focal deficits, no tremors  Back no deformity, no CVA tnd.         Assessment and plan:  Diagnoses and all orders for this visit:    Hemorrhoids, complicated  -     MRI 3T RECTAL CANCER (W+WO) (CPT=72197); Future    AIN (anal intraepithelial neoplasia) anal canal  -     MRI 3T RECTAL CANCER (W+WO) (CPT=72197); Future    Abnormal CT scan, pelvis  -     MRI 3T RECTAL CANCER (W+WO) (CPT=72197); Future    Abnormal CT of the abdomen    Abnormality of rectum  -     MRI 3T RECTAL CANCER (W+WO) (CPT=72197); Future    Adrenal abnormality (HCC)  Comments:  MRI recommended by radiology for indeterminate lesion  Orders:  -     MRI ABDOMEN (W+WO) (CPT=74183); Future       Will proceed with MRI, rectal and abdominal, for better evaluation of CT and endoscopy findings. Will refer on to colorectal surgery for opinion. We discussed the diagnoses, findings, plan. All of the patient's questions were answered to his  satisfaction.       Debbi Dueñas MD  2/28/2024  10:38 AM

## 2024-02-28 NOTE — TELEPHONE ENCOUNTER
Per pt first available mri is in April and states Dr. Dueñas wanted it done within a week. Per pt the order needs to be changed to stat. Please advise

## 2024-02-29 ENCOUNTER — TELEPHONE (OUTPATIENT)
Dept: INTERNAL MEDICINE CLINIC | Facility: CLINIC | Age: 59
End: 2024-02-29

## 2024-03-01 NOTE — TELEPHONE ENCOUNTER
Called and check in, reviewed CT Scan. Saw Dr. Dueñas, will see Dr. Mills of colorectal surgery. MRI this Saturday

## 2024-03-02 ENCOUNTER — HOSPITAL ENCOUNTER (OUTPATIENT)
Dept: MRI IMAGING | Facility: HOSPITAL | Age: 59
Discharge: HOME | End: 2024-03-02
Attending: SURGERY
Payer: COMMERCIAL

## 2024-03-02 DIAGNOSIS — K62.82 AIN (ANAL INTRAEPITHELIAL NEOPLASIA) ANAL CANAL: ICD-10-CM

## 2024-03-02 DIAGNOSIS — K64.8 HEMORRHOIDS, COMPLICATED: ICD-10-CM

## 2024-03-02 DIAGNOSIS — R93.5 ABNORMAL CT SCAN, PELVIS: ICD-10-CM

## 2024-03-02 DIAGNOSIS — K62.9 ABNORMALITY OF RECTUM: ICD-10-CM

## 2024-03-02 PROCEDURE — 72197 MRI PELVIS W/O & W/DYE: CPT | Performed by: SURGERY

## 2024-03-02 PROCEDURE — A9575 INJ GADOTERATE MEGLUMI 0.1ML: HCPCS | Performed by: SURGERY

## 2024-03-02 RX ORDER — GADOTERATE MEGLUMINE 376.9 MG/ML
20 INJECTION INTRAVENOUS
Status: COMPLETED | OUTPATIENT
Start: 2024-03-02 | End: 2024-03-02

## 2024-03-02 RX ADMIN — GADOTERATE MEGLUMINE 20 ML: 376.9 INJECTION INTRAVENOUS at 11:48:00

## 2024-03-04 ENCOUNTER — OFFICE VISIT (OUTPATIENT)
Facility: LOCATION | Age: 59
End: 2024-03-04
Payer: COMMERCIAL

## 2024-03-04 VITALS — TEMPERATURE: 97 F | HEART RATE: 67 BPM

## 2024-03-04 DIAGNOSIS — K62.89 RECTAL MASS: Primary | ICD-10-CM

## 2024-03-04 NOTE — PROCEDURES
Procedure: Anoscopy    Surgeon: Dinesh Mills    Anesthesia: None    Findings: See the progress note attached for all findings    Operative Summary: The patient was placed in a prone position on the proctoscopy table, the hips were flexed in the jackknife position.    Using a well-lubricated finger, digital rectal exam was performed in anticipation of the anoscope.    The anoscope was then inserted under direct visualization.    Excellent visualization was performed in a 360° fashion.    The scope was removed. The patient was taken out of the prone, jackknife, position.     The patient tolerated the procedure well.

## 2024-03-04 NOTE — H&P
New Patient Visit Note       Active Problems      1. Rectal mass        Chief Complaint   Chief Complaint   Patient presents with    New Patient     NP-  Procedure: COLONOSCOPY;  Surgeon: Rosalina Gay MD;  Location: [facility],  2/25 CT abdomen/Pelvis, 3/2 MRI;        History of Present Illness   This is a very nice 58-year-old gentleman referred to me as a colorectal specialist with concern for an anorectal mass/abnormal tissue within the anal canal.  Patient had a positive FIT test and underwent a diagnostic colonoscopy with Dr. Gay at [facility] on 2/15/2024.  This colonoscopy revealed 5 polyps, largest measuring 6 mm per report.  In addition, Dr. Gay reports \"A retroflexed view of the rectum revealed hemorrhoids and one prolapsing likely anal papillae but irregular tissue distally not biopsied given risk of pain and bleeding.\"  Dr. Gay recommending having the hemorrhoid/anal papilla evaluated by surgery.    Patient followed up with his primary care provider, Dr. Fletcher, and was told that there was concern for possible anal dysplasia/AIN. Dr. Fletcher ordered a CT scan of the abdomen and pelvis performed on 2/25/2024 showing, \"Nonspecific lobulated enhancing nodularity of the anal canal and enhancement of the right aspect of the more proximal anorectal region; these findings are not well assessed by CT. Correlation with endoscopic findings is suggested with potential follow-up MRI (3T rectal protocol) if warranted on clinical grounds.\"  Patient was also evaluated by Dr. Debbi Dueñas, a general surgeon at [facility].  Her exam with anoscopy showed \"Rectal internal and external hemorrhoids without bleeding, radial white raised anal lesion noted, anoscopy limited exam, hemorrhoids, no rectal ulceration of obvious mass.\"  Dr. Dueñas recommended a pelvic MRI and referral to colorectal surgery.    MRI performed at [facility] on 3/2/2024 showed \"Curvilinear peripherally enhancing lesion involving the right lateral aspect  of the anal canal measuring approximately 3.0 x 0.5 x 1.7 cm.  The lesion appears to be centered within the submucosa of the anal canal with abutment of the mucosal surface   at 9 o'clock inferiorly.  A thrombosed/superinflamed hemorrhoid is a possibility.  A simple linear perianal fissure is also a consideration, however there does not appear to be extension into the intersphincteric space.  While not entirely excluded, note the appearance is extremely atypical for a rectal cancer.  Correlate clinically with recent endoscopy results.\"    Patient denies any rectal pain, bleeding or prolapse.  He does practice anal receptive intercourse.  He did note his last attempt at anal receptive intercourse was painful about 1 month ago.  He has no history of HPV, HIV or smoking.  Patient did have a hemorrhoidectomy around age 19 and he recalls a very painful postoperative recovery period.  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      Allergies  Wilian is allergic to penicillins and shellfish-derived products.    Past Medical / Surgical / Social / Family History    The past medical and past surgical history have been reviewed by me today.    Past Medical History:   Diagnosis Date    H/O vasectomy     1995; reversal [id number]    IBS (irritable bowel syndrome)      Past Surgical History:   Procedure Laterality Date    COLONOSCOPY N/A 2/15/2024    Procedure: COLONOSCOPY;  Surgeon: Rosalina Gay MD;  Location: [facility] ENDO    HEMORRHOIDECTOMY      1985; interal hemorrhoids    HERNIA SURGERY      INGUINAL HERNIA REPAIR Bilateral 1991    TONSILLECTOMY         The family history and social history have been reviewed by me today.    Family History   Problem Relation Age of Onset    Heart Disorder Father     Cancer Father 51        Prostate Cancer, treated with seeds    Psychiatric Mother         Anxiety, Depression    Heart Disorder Maternal Grandfather     Cancer Paternal Grandmother          Stomach cancer    Heart Disorder Paternal Grandfather      Social History     Socioeconomic History    Marital status: Single   Tobacco Use    Smoking status: Never    Smokeless tobacco: Never   Vaping Use    Vaping Use: Never used   Substance and Sexual Activity    Alcohol use: Yes     Comment: social; very rare    Drug use: Never        Current Outpatient Medications:     acyclovir 400 MG Oral Tab, Take 1 tablet (400 mg total) by mouth 3 (three) times daily. For 3 to 4 days as directed. Complete fasting labs for future refills, Disp: 30 tablet, Rfl: 0    sildenafil 20 MG Oral Tab, Take 0.5 tablets (10 mg total) by mouth daily as needed., Disp: 30 tablet, Rfl: 1    cyclobenzaprine 5 MG Oral Tab, Take 1 tablet (5 mg total) by mouth nightly. (Patient taking differently: Take 1 tablet (5 mg total) by mouth nightly as needed.), Disp: 30 tablet, Rfl: 0    citalopram 10 MG Oral Tab, TAKE 1 TABLET BY MOUTH EVERY DAY, Disp: 90 tablet, Rfl: 3    fluticasone propionate 50 MCG/ACT Nasal Suspension, 1 spray by Nasal route daily., Disp: , Rfl:     EPINEPHrine 0.3 MG/0.3ML Injection Solution Auto-injector, Inject 0.3 mL (1 each total) into the muscle See Admin Instructions. Use as directed, Disp: 1 each, Rfl: 3    dicyclomine 20 MG Oral Tab, Take 1 tablet (20 mg total) by mouth 4 (four) times daily before meals and nightly. (Patient taking differently: Take 1 tablet (20 mg total) by mouth 4 (four) times daily as needed.), Disp: 120 tablet, Rfl: 3    hydrocortisone 2.5 % External Cream, Place 1 Application rectally 2 (two) times daily., Disp: 1 each, Rfl: 3      Review of Systems  A 10 point review of systems was performed and negative unless otherwise documented per HPI.    Physical Findings   Pulse 67   Temp 96.8 °F (36 °C)   Physical Exam  Vitals and nursing note reviewed. Exam conducted with a chaperone present.   Constitutional:       General: He is not in acute distress.  HENT:      Head: Normocephalic and atraumatic.       Mouth/Throat:      Mouth: Mucous membranes are moist.   Cardiovascular:      Rate and Rhythm: Normal rate and regular rhythm.   Pulmonary:      Effort: Pulmonary effort is normal.   Abdominal:      General: There is no distension.      Palpations: Abdomen is soft.      Tenderness: There is no abdominal tenderness.   Genitourinary:     Comments: Patient examined in the prone jackknife position with medical assistant chaperone present.  External exam of the anus reveals 2 small areas of external hemorrhoid/skin tags with partially prolapsing internal hemorrhoids.  There is no external mass or skin lesion.  Digital rectal exam shows subtle nodular tissue versus scarring along the right anterior anal canal with good tone, no drainage or bleeding.  Anoscopy reveals internal hemorrhoids with some subtle scaling and plaque-like tissue overlying the hemorrhoid tissue most prominent in the right anterior/right lateral position.  Exam was somewhat limited due to patient discomfort.  Musculoskeletal:         General: No deformity.   Skin:     General: Skin is warm and dry.   Neurological:      General: No focal deficit present.      Mental Status: He is alert.   Psychiatric:         Mood and Affect: Mood normal.             Assessment   1. Rectal mass        This is a very nice 58-year-old gentleman referred to me as a colorectal specialist with concern for an anorectal mass/abnormal tissue within the anal canal.  Patient had a positive FIT test and underwent a diagnostic colonoscopy with Dr. Gay at [facility] on 2/15/2024.  This colonoscopy revealed 5 polyps, largest measuring 6 mm per report.  In addition, Dr. Gay reports \"A retroflexed view of the rectum revealed hemorrhoids and one prolapsing likely anal papillae but irregular tissue distally not biopsied given risk of pain and bleeding.\"  Dr. Gay recommending having the hemorrhoid/anal papilla evaluated by surgery.    Patient followed up with his primary care  provider, Dr. Fletcher, and was told that there was concern for possible anal dysplasia/AIN. Dr. Fletcher ordered a CT scan of the abdomen and pelvis performed on 2/25/2024 showing, \"Nonspecific lobulated enhancing nodularity of the anal canal and enhancement of the right aspect of the more proximal anorectal region; these findings are not well assessed by CT. Correlation with endoscopic findings is suggested with potential follow-up MRI (3T rectal protocol) if warranted on clinical grounds.\"  Patient was also evaluated by Dr. Debbi Dueñas, a general surgeon at [facility].  Her exam with anoscopy showed \"Rectal internal and external hemorrhoids without bleeding, radial white raised anal lesion noted, anoscopy limited exam, hemorrhoids, no rectal ulceration of obvious mass.\"  Dr. Dueñas recommended a pelvic MRI and referral to colorectal surgery.    MRI performed at [facility] on 3/2/2024 showed \"Curvilinear peripherally enhancing lesion involving the right lateral aspect of the anal canal measuring approximately 3.0 x 0.5 x 1.7 cm.  The lesion appears to be centered within the submucosa of the anal canal with abutment of the mucosal surface   at 9 o'clock inferiorly.  A thrombosed/superinflamed hemorrhoid is a possibility.  A simple linear perianal fissure is also a consideration, however there does not appear to be extension into the intersphincteric space.  While not entirely excluded, note the appearance is extremely atypical for a rectal cancer.  Correlate clinically with recent endoscopy results.\"    Patient denies any rectal pain, bleeding or prolapse.  He does practice anal receptive intercourse.  He did note his last attempt at anal receptive intercourse was painful about 1 month ago.  He has no history of HPV, HIV or smoking.  Patient did have a hemorrhoidectomy around age 19 and he recalls a very painful postoperative recovery period.  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    External exam of the anus reveals 2 small areas of external hemorrhoid/skin tags with partially prolapsing internal hemorrhoids.  There is no external mass or skin lesion.  Digital rectal exam shows subtle nodular tissue versus scarring along the right anterior anal canal with good tone, no drainage or bleeding.  Anoscopy reveals internal hemorrhoids with some subtle scaling and plaque-like tissue overlying the hemorrhoid tissue most prominent in the right anterior/right lateral position.  Exam was somewhat limited due to patient discomfort.    Plan  I discussed my exam findings with the patient.  At this point, there has been no attempt to obtain tissue diagnosis of this questionable abnormal tissue in the anal canal noted on endoscopic examination, bedside examination and imaging studies.  Differential diagnosis includes but not limited to squamous metaplasia of chronically prolapsing hemorrhoid tissue, anal dysplasia and less likely malignancy.    Though my suspicion for malignancy is fairly low at this time, I do recommend planning for anal exam under anesthesia with anoscopy and likely biopsies of any questionable/suspicious tissue within the anal canal.  The details of this procedure were discussed including the expected recovery time, risks, benefits and alternatives.  Patient expressed understanding and was agreeable to schedule surgery with me.  This has been scheduled for Friday 4/5/2024.  Any next steps in management will be determined pending intraoperative findings and pathology results for many biopsies taken during this procedure.     No orders of the defined types were placed in this encounter.      Imaging & Referrals   None    Follow Up  No follow-ups on file.    Dinesh Mills MD

## 2024-03-21 ENCOUNTER — TELEPHONE (OUTPATIENT)
Facility: LOCATION | Age: 59
End: 2024-03-21

## 2024-03-21 NOTE — TELEPHONE ENCOUNTER
No prior authorization required for cpt code 92949. Ref #: [id number]. Spoke with Jennifer BUNDY @ 2:16pm on 3/21

## 2024-04-04 ENCOUNTER — ANESTHESIA EVENT (OUTPATIENT)
Dept: SURGERY | Facility: HOSPITAL | Age: 59
End: 2024-04-04
Payer: COMMERCIAL

## 2024-04-05 ENCOUNTER — ANESTHESIA (OUTPATIENT)
Dept: SURGERY | Facility: HOSPITAL | Age: 59
End: 2024-04-05
Payer: COMMERCIAL

## 2024-04-05 ENCOUNTER — HOSPITAL ENCOUNTER (OUTPATIENT)
Facility: HOSPITAL | Age: 59
Setting detail: HOSPITAL OUTPATIENT SURGERY
Discharge: HOME | End: 2024-04-05
Attending: STUDENT IN AN ORGANIZED HEALTH CARE EDUCATION/TRAINING PROGRAM | Admitting: STUDENT IN AN ORGANIZED HEALTH CARE EDUCATION/TRAINING PROGRAM
Payer: COMMERCIAL

## 2024-04-05 VITALS
HEIGHT: 73 IN | OXYGEN SATURATION: 96 % | SYSTOLIC BLOOD PRESSURE: 121 MMHG | WEIGHT: 253.38 LBS | BODY MASS INDEX: 33.58 KG/M2 | HEART RATE: 79 BPM | RESPIRATION RATE: 18 BRPM | TEMPERATURE: 98 F | DIASTOLIC BLOOD PRESSURE: 66 MMHG

## 2024-04-05 DIAGNOSIS — K62.89 RECTAL MASS: ICD-10-CM

## 2024-04-05 DIAGNOSIS — G89.18 POSTOPERATIVE PAIN: Primary | ICD-10-CM

## 2024-04-05 PROCEDURE — 0DJD8ZZ INSPECTION OF LOWER INTESTINAL TRACT, VIA NATURAL OR ARTIFICIAL OPENING ENDOSCOPIC: ICD-10-PCS | Performed by: STUDENT IN AN ORGANIZED HEALTH CARE EDUCATION/TRAINING PROGRAM

## 2024-04-05 PROCEDURE — 0DBQ8ZZ EXCISION OF ANUS, VIA NATURAL OR ARTIFICIAL OPENING ENDOSCOPIC: ICD-10-PCS | Performed by: STUDENT IN AN ORGANIZED HEALTH CARE EDUCATION/TRAINING PROGRAM

## 2024-04-05 PROCEDURE — 0DBQ7ZX EXCISION OF ANUS, VIA NATURAL OR ARTIFICIAL OPENING, DIAGNOSTIC: ICD-10-PCS | Performed by: STUDENT IN AN ORGANIZED HEALTH CARE EDUCATION/TRAINING PROGRAM

## 2024-04-05 PROCEDURE — 46220 EXCISE ANAL EXT TAG/PAPILLA: CPT

## 2024-04-05 PROCEDURE — 46220 EXCISE ANAL EXT TAG/PAPILLA: CPT | Performed by: STUDENT IN AN ORGANIZED HEALTH CARE EDUCATION/TRAINING PROGRAM

## 2024-04-05 RX ORDER — LIDOCAINE HYDROCHLORIDE 10 MG/ML
INJECTION, SOLUTION EPIDURAL; INFILTRATION; INTRACAUDAL; PERINEURAL AS NEEDED
Status: DISCONTINUED | OUTPATIENT
Start: 2024-04-05 | End: 2024-04-05 | Stop reason: SURG

## 2024-04-05 RX ORDER — CEFAZOLIN SODIUM/WATER 2 G/20 ML
SYRINGE (ML) INTRAVENOUS
Status: DISCONTINUED
Start: 2024-04-05 | End: 2024-04-05

## 2024-04-05 RX ORDER — HYDROCODONE BITARTRATE AND ACETAMINOPHEN 5; 325 MG/1; MG/1
1 TABLET ORAL ONCE AS NEEDED
Status: DISCONTINUED | OUTPATIENT
Start: 2024-04-05 | End: 2024-04-05

## 2024-04-05 RX ORDER — NALOXONE HYDROCHLORIDE 0.4 MG/ML
0.08 INJECTION, SOLUTION INTRAMUSCULAR; INTRAVENOUS; SUBCUTANEOUS AS NEEDED
Status: DISCONTINUED | OUTPATIENT
Start: 2024-04-05 | End: 2024-04-05

## 2024-04-05 RX ORDER — ACETAMINOPHEN 500 MG
1000 TABLET ORAL ONCE
Status: DISCONTINUED | OUTPATIENT
Start: 2024-04-05 | End: 2024-04-05 | Stop reason: HOSPADM

## 2024-04-05 RX ORDER — HYDROMORPHONE HYDROCHLORIDE 1 MG/ML
0.6 INJECTION, SOLUTION INTRAMUSCULAR; INTRAVENOUS; SUBCUTANEOUS EVERY 5 MIN PRN
Status: DISCONTINUED | OUTPATIENT
Start: 2024-04-05 | End: 2024-04-05

## 2024-04-05 RX ORDER — DIPHENHYDRAMINE HYDROCHLORIDE 50 MG/ML
12.5 INJECTION INTRAMUSCULAR; INTRAVENOUS AS NEEDED
Status: DISCONTINUED | OUTPATIENT
Start: 2024-04-05 | End: 2024-04-05

## 2024-04-05 RX ORDER — HEPARIN SODIUM 5000 [USP'U]/ML
INJECTION, SOLUTION INTRAVENOUS; SUBCUTANEOUS
Status: DISCONTINUED
Start: 2024-04-05 | End: 2024-04-05

## 2024-04-05 RX ORDER — HYDROMORPHONE HYDROCHLORIDE 1 MG/ML
0.4 INJECTION, SOLUTION INTRAMUSCULAR; INTRAVENOUS; SUBCUTANEOUS EVERY 5 MIN PRN
Status: DISCONTINUED | OUTPATIENT
Start: 2024-04-05 | End: 2024-04-05

## 2024-04-05 RX ORDER — SCOLOPAMINE TRANSDERMAL SYSTEM 1 MG/1
1 PATCH, EXTENDED RELEASE TRANSDERMAL ONCE
Status: DISCONTINUED | OUTPATIENT
Start: 2024-04-05 | End: 2024-04-05 | Stop reason: HOSPADM

## 2024-04-05 RX ORDER — SODIUM CHLORIDE, SODIUM LACTATE, POTASSIUM CHLORIDE, CALCIUM CHLORIDE 600; 310; 30; 20 MG/100ML; MG/100ML; MG/100ML; MG/100ML
INJECTION, SOLUTION INTRAVENOUS CONTINUOUS
Status: DISCONTINUED | OUTPATIENT
Start: 2024-04-05 | End: 2024-04-05

## 2024-04-05 RX ORDER — HYDROMORPHONE HYDROCHLORIDE 1 MG/ML
0.2 INJECTION, SOLUTION INTRAMUSCULAR; INTRAVENOUS; SUBCUTANEOUS EVERY 5 MIN PRN
Status: DISCONTINUED | OUTPATIENT
Start: 2024-04-05 | End: 2024-04-05

## 2024-04-05 RX ORDER — ROCURONIUM BROMIDE 10 MG/ML
INJECTION, SOLUTION INTRAVENOUS AS NEEDED
Status: DISCONTINUED | OUTPATIENT
Start: 2024-04-05 | End: 2024-04-05 | Stop reason: SURG

## 2024-04-05 RX ORDER — KETOROLAC TROMETHAMINE 30 MG/ML
INJECTION, SOLUTION INTRAMUSCULAR; INTRAVENOUS AS NEEDED
Status: DISCONTINUED | OUTPATIENT
Start: 2024-04-05 | End: 2024-04-05 | Stop reason: SURG

## 2024-04-05 RX ORDER — MEPERIDINE HYDROCHLORIDE 25 MG/ML
12.5 INJECTION INTRAMUSCULAR; INTRAVENOUS; SUBCUTANEOUS AS NEEDED
Status: DISCONTINUED | OUTPATIENT
Start: 2024-04-05 | End: 2024-04-05

## 2024-04-05 RX ORDER — NEOSTIGMINE METHYLSULFATE 1 MG/ML
INJECTION, SOLUTION INTRAVENOUS AS NEEDED
Status: DISCONTINUED | OUTPATIENT
Start: 2024-04-05 | End: 2024-04-05 | Stop reason: SURG

## 2024-04-05 RX ORDER — LIDOCAINE HYDROCHLORIDE 40 MG/ML
INJECTION, SOLUTION RETROBULBAR AS NEEDED
Status: DISCONTINUED | OUTPATIENT
Start: 2024-04-05 | End: 2024-04-05 | Stop reason: SURG

## 2024-04-05 RX ORDER — GLYCOPYRROLATE 0.2 MG/ML
INJECTION, SOLUTION INTRAMUSCULAR; INTRAVENOUS AS NEEDED
Status: DISCONTINUED | OUTPATIENT
Start: 2024-04-05 | End: 2024-04-05 | Stop reason: SURG

## 2024-04-05 RX ORDER — PROCHLORPERAZINE EDISYLATE 5 MG/ML
5 INJECTION INTRAMUSCULAR; INTRAVENOUS EVERY 8 HOURS PRN
Status: DISCONTINUED | OUTPATIENT
Start: 2024-04-05 | End: 2024-04-05

## 2024-04-05 RX ORDER — DEXAMETHASONE SODIUM PHOSPHATE 4 MG/ML
VIAL (ML) INJECTION AS NEEDED
Status: DISCONTINUED | OUTPATIENT
Start: 2024-04-05 | End: 2024-04-05 | Stop reason: SURG

## 2024-04-05 RX ORDER — ONDANSETRON 2 MG/ML
4 INJECTION INTRAMUSCULAR; INTRAVENOUS EVERY 6 HOURS PRN
Status: DISCONTINUED | OUTPATIENT
Start: 2024-04-05 | End: 2024-04-05

## 2024-04-05 RX ORDER — HYDROCODONE BITARTRATE AND ACETAMINOPHEN 5; 325 MG/1; MG/1
1 TABLET ORAL EVERY 6 HOURS PRN
Qty: 12 TABLET | Refills: 0 | Status: SHIPPED | OUTPATIENT
Start: 2024-04-05

## 2024-04-05 RX ORDER — ONDANSETRON 2 MG/ML
INJECTION INTRAMUSCULAR; INTRAVENOUS AS NEEDED
Status: DISCONTINUED | OUTPATIENT
Start: 2024-04-05 | End: 2024-04-05 | Stop reason: SURG

## 2024-04-05 RX ORDER — MIDAZOLAM HYDROCHLORIDE 1 MG/ML
1 INJECTION INTRAMUSCULAR; INTRAVENOUS EVERY 5 MIN PRN
Status: DISCONTINUED | OUTPATIENT
Start: 2024-04-05 | End: 2024-04-05

## 2024-04-05 RX ORDER — ACETAMINOPHEN 500 MG
1000 TABLET ORAL ONCE AS NEEDED
Status: DISCONTINUED | OUTPATIENT
Start: 2024-04-05 | End: 2024-04-05

## 2024-04-05 RX ORDER — CEFAZOLIN SODIUM/WATER 2 G/20 ML
2 SYRINGE (ML) INTRAVENOUS ONCE
Status: COMPLETED | OUTPATIENT
Start: 2024-04-05 | End: 2024-04-05

## 2024-04-05 RX ORDER — HYDROCODONE BITARTRATE AND ACETAMINOPHEN 5; 325 MG/1; MG/1
2 TABLET ORAL ONCE AS NEEDED
Status: DISCONTINUED | OUTPATIENT
Start: 2024-04-05 | End: 2024-04-05

## 2024-04-05 RX ORDER — BUPIVACAINE HYDROCHLORIDE AND EPINEPHRINE 5; 5 MG/ML; UG/ML
INJECTION, SOLUTION EPIDURAL; INTRACAUDAL; PERINEURAL AS NEEDED
Status: DISCONTINUED | OUTPATIENT
Start: 2024-04-05 | End: 2024-04-05 | Stop reason: HOSPADM

## 2024-04-05 RX ORDER — HEPARIN SODIUM 5000 [USP'U]/ML
5000 INJECTION, SOLUTION INTRAVENOUS; SUBCUTANEOUS ONCE
Status: COMPLETED | OUTPATIENT
Start: 2024-04-05 | End: 2024-04-05

## 2024-04-05 RX ADMIN — ONDANSETRON 4 MG: 2 INJECTION INTRAMUSCULAR; INTRAVENOUS at 09:40:00

## 2024-04-05 RX ADMIN — NEOSTIGMINE METHYLSULFATE 3.5 MG: 1 INJECTION, SOLUTION INTRAVENOUS at 10:19:00

## 2024-04-05 RX ADMIN — DEXAMETHASONE SODIUM PHOSPHATE 4 MG: 4 MG/ML VIAL (ML) INJECTION at 09:40:00

## 2024-04-05 RX ADMIN — SODIUM CHLORIDE, SODIUM LACTATE, POTASSIUM CHLORIDE, CALCIUM CHLORIDE: 600; 310; 30; 20 INJECTION, SOLUTION INTRAVENOUS at 09:34:00

## 2024-04-05 RX ADMIN — LIDOCAINE HYDROCHLORIDE 50 MG: 10 INJECTION, SOLUTION EPIDURAL; INFILTRATION; INTRACAUDAL; PERINEURAL at 09:35:00

## 2024-04-05 RX ADMIN — ROCURONIUM BROMIDE 30 MG: 10 INJECTION, SOLUTION INTRAVENOUS at 09:38:00

## 2024-04-05 RX ADMIN — CEFAZOLIN SODIUM/WATER 2 G: 2 G/20 ML SYRINGE (ML) INTRAVENOUS at 09:40:00

## 2024-04-05 RX ADMIN — KETOROLAC TROMETHAMINE 30 MG: 30 INJECTION, SOLUTION INTRAMUSCULAR; INTRAVENOUS at 10:19:00

## 2024-04-05 RX ADMIN — LIDOCAINE HYDROCHLORIDE 4 ML: 40 INJECTION, SOLUTION RETROBULBAR at 09:36:00

## 2024-04-05 RX ADMIN — SODIUM CHLORIDE, SODIUM LACTATE, POTASSIUM CHLORIDE, CALCIUM CHLORIDE: 600; 310; 30; 20 INJECTION, SOLUTION INTRAVENOUS at 10:21:00

## 2024-04-05 RX ADMIN — GLYCOPYRROLATE 0.4 MG: 0.2 INJECTION, SOLUTION INTRAMUSCULAR; INTRAVENOUS at 10:19:00

## 2024-04-05 NOTE — H&P
New Patient Visit Note       Active Problems      No diagnosis found.      Chief Complaint   No chief complaint on file.      History of Present Illness   This is a very nice 58-year-old gentleman referred to me as a colorectal specialist with concern for an anorectal mass/abnormal tissue within the anal canal.  Patient had a positive FIT test and underwent a diagnostic colonoscopy with Dr. Gay at [place] on 2/15/2024.  This colonoscopy revealed 5 polyps, largest measuring 6 mm per report.  In addition, Dr. Gay reports \"A retroflexed view of the rectum revealed hemorrhoids and one prolapsing likely anal papillae but irregular tissue distally not biopsied given risk of pain and bleeding.\"  Dr. Gay recommending having the hemorrhoid/anal papilla evaluated by surgery.    Patient followed up with his primary care provider, Dr. Fletcher, and was told that there was concern for possible anal dysplasia/AIN. Dr. Fletcher ordered a CT scan of the abdomen and pelvis performed on 2/25/2024 showing, \"Nonspecific lobulated enhancing nodularity of the anal canal and enhancement of the right aspect of the more proximal anorectal region; these findings are not well assessed by CT. Correlation with endoscopic findings is suggested with potential follow-up MRI (3T rectal protocol) if warranted on clinical grounds.\"  Patient was also evaluated by Dr. Debbi Duñeas, a general surgeon at [facility].  Her exam with anoscopy showed \"Rectal internal and external hemorrhoids without bleeding, radial white raised anal lesion noted, anoscopy limited exam, hemorrhoids, no rectal ulceration of obvious mass.\"  Dr. Dueñas recommended a pelvic MRI and referral to colorectal surgery.    MRI performed at [facility] on 3/2/2024 showed \"Curvilinear peripherally enhancing lesion involving the right lateral aspect of the anal canal measuring approximately 3.0 x 0.5 x 1.7 cm.  The lesion appears to be centered within the submucosa of the anal canal with  abutment of the mucosal surface   at 9 o'clock inferiorly.  A thrombosed/superinflamed hemorrhoid is a possibility.  A simple linear perianal fissure is also a consideration, however there does not appear to be extension into the intersphincteric space.  While not entirely excluded, note the appearance is extremely atypical for a rectal cancer.  Correlate clinically with recent endoscopy results.\"    Patient denies any rectal pain, bleeding or prolapse.  He does practice anal receptive intercourse.  He did note his last attempt at anal receptive intercourse was painful about 1 month ago.  He has no history of HPV, HIV or smoking.  Patient did have a hemorrhoidectomy around age 19 and he recalls a very painful postoperative recovery period.  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      Allergies  Wilian is allergic to penicillins and shellfish-derived products.    Past Medical / Surgical / Social / Family History    The past medical and past surgical history have been reviewed by me today.    Past Medical History:   Diagnosis Date    H/O vasectomy     1995; reversal [id number]    IBS (irritable bowel syndrome)      Past Surgical History:   Procedure Laterality Date    COLONOSCOPY N/A 2/15/2024    Procedure: COLONOSCOPY;  Surgeon: Rosalina Gay MD;  Location: [facility] ENDO    HEMORRHOIDECTOMY      1985; interal hemorrhoids    HERNIA SURGERY      INGUINAL HERNIA REPAIR Bilateral 1991    TONSILLECTOMY         The family history and social history have been reviewed by me today.    Family History   Problem Relation Age of Onset    Heart Disorder Father     Cancer Father 51        Prostate Cancer, treated with seeds    Psychiatric Mother         Anxiety, Depression    Heart Disorder Maternal Grandfather     Cancer Paternal Grandmother         Stomach cancer    Heart Disorder Paternal Grandfather      Social History     Socioeconomic History    Marital status: Single   Tobacco Use     Smoking status: Never    Smokeless tobacco: Never   Vaping Use    Vaping Use: Never used   Substance and Sexual Activity    Alcohol use: Yes     Comment: social; very rare    Drug use: Never      No current outpatient medications on file.      Review of Systems  A 10 point review of systems was performed and negative unless otherwise documented per HPI.    Physical Findings   Ht 73\"   Wt 250 lb (113.4 kg)   BMI 32.98 kg/m²   Physical Exam  Vitals and nursing note reviewed. Exam conducted with a chaperone present.   Constitutional:       General: He is not in acute distress.  HENT:      Head: Normocephalic and atraumatic.      Mouth/Throat:      Mouth: Mucous membranes are moist.   Cardiovascular:      Rate and Rhythm: Normal rate and regular rhythm.   Pulmonary:      Effort: Pulmonary effort is normal.   Abdominal:      General: There is no distension.      Palpations: Abdomen is soft.      Tenderness: There is no abdominal tenderness.   Genitourinary:     Comments: Patient examined in the prone jackknife position with medical assistant chaperone present.  External exam of the anus reveals 2 small areas of external hemorrhoid/skin tags with partially prolapsing internal hemorrhoids.  There is no external mass or skin lesion.  Digital rectal exam shows subtle nodular tissue versus scarring along the right anterior anal canal with good tone, no drainage or bleeding.  Anoscopy reveals internal hemorrhoids with some subtle scaling and plaque-like tissue overlying the hemorrhoid tissue most prominent in the right anterior/right lateral position.  Exam was somewhat limited due to patient discomfort.  Musculoskeletal:         General: No deformity.   Skin:     General: Skin is warm and dry.   Neurological:      General: No focal deficit present.      Mental Status: He is alert.   Psychiatric:         Mood and Affect: Mood normal.             Assessment   No diagnosis found.      This is a very nice 58-year-old gentleman  referred to me as a colorectal specialist with concern for an anorectal mass/abnormal tissue within the anal canal.  Patient had a positive FIT test and underwent a diagnostic colonoscopy with Dr. Gay at [place] on 2/15/2024.  This colonoscopy revealed 5 polyps, largest measuring 6 mm per report.  In addition, Dr. Gay reports \"A retroflexed view of the rectum revealed hemorrhoids and one prolapsing likely anal papillae but irregular tissue distally not biopsied given risk of pain and bleeding.\"  Dr. Gay recommending having the hemorrhoid/anal papilla evaluated by surgery.    Patient followed up with his primary care provider, Dr. Fletcher, and was told that there was concern for possible anal dysplasia/AIN. Dr. Fletcher ordered a CT scan of the abdomen and pelvis performed on 2/25/2024 showing, \"Nonspecific lobulated enhancing nodularity of the anal canal and enhancement of the right aspect of the more proximal anorectal region; these findings are not well assessed by CT. Correlation with endoscopic findings is suggested with potential follow-up MRI (3T rectal protocol) if warranted on clinical grounds.\"  Patient was also evaluated by Dr. Debbi Dueñas, a general surgeon at [place].  Her exam with anoscopy showed \"Rectal internal and external hemorrhoids without bleeding, radial white raised anal lesion noted, anoscopy limited exam, hemorrhoids, no rectal ulceration of obvious mass.\"  Dr. Dueñas recommended a pelvic MRI and referral to colorectal surgery.    MRI performed at [facility] on 3/2/2024 showed \"Curvilinear peripherally enhancing lesion involving the right lateral aspect of the anal canal measuring approximately 3.0 x 0.5 x 1.7 cm.  The lesion appears to be centered within the submucosa of the anal canal with abutment of the mucosal surface   at 9 o'clock inferiorly.  A thrombosed/superinflamed hemorrhoid is a possibility.  A simple linear perianal fissure is also a consideration, however there does not  appear to be extension into the intersphincteric space.  While not entirely excluded, note the appearance is extremely atypical for a rectal cancer.  Correlate clinically with recent endoscopy results.\"    Patient denies any rectal pain, bleeding or prolapse.  He does practice anal receptive intercourse.  He did note his last attempt at anal receptive intercourse was painful about 1 month ago.  He has no history of HPV, HIV or smoking.  Patient did have a hemorrhoidectomy around age 19 and he recalls a very painful postoperative recovery period.  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    External exam of the anus reveals 2 small areas of external hemorrhoid/skin tags with partially prolapsing internal hemorrhoids.  There is no external mass or skin lesion.  Digital rectal exam shows subtle nodular tissue versus scarring along the right anterior anal canal with good tone, no drainage or bleeding.  Anoscopy reveals internal hemorrhoids with some subtle scaling and plaque-like tissue overlying the hemorrhoid tissue most prominent in the right anterior/right lateral position.  Exam was somewhat limited due to patient discomfort.    Plan  I discussed my exam findings with the patient.  At this point, there has been no attempt to obtain tissue diagnosis of this questionable abnormal tissue in the anal canal noted on endoscopic examination, bedside examination and imaging studies.  Differential diagnosis includes but not limited to squamous metaplasia of chronically prolapsing hemorrhoid tissue, anal dysplasia and less likely malignancy.    Though my suspicion for malignancy is fairly low at this time, I do recommend planning for anal exam under anesthesia with anoscopy and likely biopsies of any questionable/suspicious tissue within the anal canal.  The details of this procedure were discussed including the expected recovery time, risks, benefits and alternatives.  Patient  expressed understanding and was agreeable to schedule surgery with me.  This has been scheduled for Friday 4/5/2024.  Any next steps in management will be determined pending intraoperative findings and pathology results for many biopsies taken during this procedure.     No orders of the defined types were placed in this encounter.      Imaging & Referrals   VITAL SIGNS  VITAL SIGNS - NOTIFY PHYSICIAN  NURSING COMMUNICATION  PLACE PIV  ACTIVITY AS TOLERATED  HEIGHT AND WEIGHT  INITIATE ADULT PREOP PROPHYLACTIC ABX PROTOCOL  VERIFY INFORMED CONSENT  NPO    Follow Up  No follow-ups on file.    Dinesh Mills MD

## 2024-04-05 NOTE — DISCHARGE INSTRUCTIONS
Anal Surgery  Post-Surgical Instructions     Dinesh Mills MD         MEDICATIONS  You will be given a prescription for pain.  Take 1 tablet every 6 hours as needed.  Pain medications will ease your pain, but you should expect some incisional pain for about 7-10 days.  You may take acetaminophen (Tylenol) up to 650 mg every 6 hours as needed for mild-moderate pain. You may take ibuprofen (Motrin) up to 600 mg every 6 hours as needed for mild-moderate pain. Take narcotic pain medication as needed for severe pain per your prescription. Do not drive while taking narcotic pain medication. Many patients take a stool softener as narcotics are known to cause constipation. You should walk often, cough and take deep breaths.       DIET  You will begin a high fiber diet.  The easiest way to a high fiber diet is to take a fiber supplement.  An excellent supplement is plain, unflavored Metamucil.  You should take one tablespoon in 8 oz of water twice a day.  Ideally, you should take the supplement before breakfast and dinner.  You may experience some gas bloating for the first 2 weeks. This is normal and will go away as long as you keep taking the supplement.  Other supplements that can be taken include Per Flora, Benefiber, Konsyl, or Citrucel. Continue to take the fiber supplement for 1 month.     In addition, it is a good idea go to the drugstore and a stool softener, such as docusate, and a gentle laxative such as MiraLAX.  Taking MiraLAX daily and stool softener 1-2 times a day may prevent you from getting overly constipated while on the narcotic drug.  Once you stop taking the prescription pain medication, you may stop taking the stool softener and laxative.        WOUND CARE   You will perform sitz baths two-three times a day for 3-4 weeks.  Sitz baths simply mean soaking your anus in a tub of warm-hot water for about 15 minutes.  Sitz baths will clean the anal wound as well as relax the anal sphincter muscles, which  will help minimize your pain.  Be careful around the anal wound, especially if you have stitches on the outside.  Gently pat your anus dry (never rub) or simply dry your anus with a blow-dryer set to cool/warm.   Do not soak your anus for more than 15 minutes.        ACTIVITY  After surgery, your driving reflexes will be slower, especially if you are taking pain medications.  Therefore, you are restricted from driving until after your follow-up visit and after you have stopped taking your prescription pain meds.  You may walk about the house, go shopping, or eat at a restaurant.  You may also climb stairs and excerise but no weight lifting.  You can sit on your wound, but keep in mind that the less you sit, the less pain you will have.     APPOINTMENT  Please call our office for an appointment in 2-4 weeks after surgery, unless otherwise instructed.  This will allow ample time for the swelling and soreness to resolve before your wound is examined.  There may be some bleeding from your wound.  This is normal.  If you begin bleeding heavily, have fevers, chills, or if you are concerned about your wound, please call us immediately at [phone].     Thank you for entrusting us with your care.     You received a drug called Toradol which is an Anti Inflammatory at: 1019am  If you are allowed to take Anti inflammatories:    Do not take any Anti Inflammatory like Motrin, Aleve or Ibuprophen until after: 419pm  Please report any suspected allergic reactions or bleeding issues to your doctor

## 2024-04-05 NOTE — OPERATIVE REPORT
[facility]  Operative Note    Wilian King Location: OR   CSN [id number] MRN [id number]    10/2/1965 Age 58 year old   Admission Date 2024 Operation Date 2024   Attending Physician Dinesh Mills MD Operating Physician Dinesh Mills MD   PCP Edgar Lala MD          Patient Name: Wilian King    Preoperative Diagnosis: Rectal mass [K62.89]    Postoperative Diagnosis:   1.  Hypertrophied anal papilla  2.  Hemorrhoidectomy scarring  3.  Grade 3 hemorrhoids    Primary Surgeon: Dinesh Mills MD    Assistant: Sonya Velarde PA-C    Anesthesia: General    Procedures: Anal exam under anesthesia with anoscopy, excision of hypertrophied anal papilla and biopsy    Implants: None    Specimen:   Hypertrophied anal papilla  Right posterior dentate line biopsy    Drains: None    Estimated Blood Loss: 5 cc    Complications: None immediate    Condition: Stable    Indications for Surgery:   This is a very nice 58-year-old gentleman referred to me as a colorectal specialist with concern for an anorectal mass/abnormal tissue within the anal canal.  Patient had a positive FIT test and underwent a diagnostic colonoscopy with Dr. aGy at [place] on 2/15/2024.  This colonoscopy revealed 5 polyps, largest measuring 6 mm per report.  In addition, Dr. Gay reports \"A retroflexed view of the rectum revealed hemorrhoids and one prolapsing likely anal papillae but irregular tissue distally not biopsied given risk of pain and bleeding.\"  Dr. Gay recommending having the hemorrhoid/anal papilla evaluated by surgery.     Patient followed up with his primary care provider, Dr. Fletcher, and was told that there was concern for possible anal dysplasia/AIN. Dr. Fletcher ordered a CT scan of the abdomen and pelvis performed on 2024 showing, \"Nonspecific lobulated enhancing nodularity of the anal canal and enhancement of the right aspect of the more proximal anorectal region; these findings are not well assessed  by CT. Correlation with endoscopic findings is suggested with potential follow-up MRI (3T rectal protocol) if warranted on clinical grounds.\"  Patient was also evaluated by Dr. Debbi Dueñas, a general surgeon at [place].  Her exam with anoscopy showed \"Rectal internal and external hemorrhoids without bleeding, radial white raised anal lesion noted, anoscopy limited exam, hemorrhoids, no rectal ulceration of obvious mass.\"  Dr. Dueñas recommended a pelvic MRI and referral to colorectal surgery.     MRI performed at [place] [facility] on 3/2/2024 showed \"Curvilinear peripherally enhancing lesion involving the right lateral aspect of the anal canal measuring approximately 3.0 x 0.5 x 1.7 cm.  The lesion appears to be centered within the submucosa of the anal canal with abutment of the mucosal surface   at 9 o'clock inferiorly.  A thrombosed/superinflamed hemorrhoid is a possibility.  A simple linear perianal fissure is also a consideration, however there does not appear to be extension into the intersphincteric space.  While not entirely excluded, note the appearance is extremely atypical for a rectal cancer.  Correlate clinically with recent endoscopy results.\"     Patient denies any rectal pain, bleeding or prolapse.  He does practice anal receptive intercourse.  He did note his last attempt at anal receptive intercourse was painful about 1 month ago.  He has no history of HPV, HIV or smoking.  Patient did have a hemorrhoidectomy around age 19 and he recalls a very painful postoperative recovery period.  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     External exam of the anus reveals 2 small areas of external hemorrhoid/skin tags with partially prolapsing internal hemorrhoids.  There is no external mass or skin lesion.  Digital rectal exam shows subtle nodular tissue versus scarring along the right anterior anal canal with good tone, no drainage or bleeding.  Anoscopy reveals  internal hemorrhoids with some subtle scaling and plaque-like tissue overlying the hemorrhoid tissue most prominent in the right anterior/right lateral position.  Exam was somewhat limited due to patient discomfort.     I discussed my exam findings with the patient.  At this point, there has been no attempt to obtain tissue diagnosis of this questionable abnormal tissue in the anal canal noted on endoscopic examination, bedside examination and imaging studies.  Differential diagnosis includes but not limited to squamous metaplasia of chronically prolapsing hemorrhoid tissue, anal dysplasia and less likely malignancy.     Though my suspicion for malignancy is fairly low at this time, I do recommend planning for anal exam under anesthesia with anoscopy and likely biopsies of any questionable/suspicious tissue within the anal canal.  The details of this procedure were discussed including the expected recovery time, risks, benefits and alternatives.  Patient expressed understanding and was agreeable to schedule surgery with me.    Patient presents for elective surgery today.  Consent was signed.  All questions answered.    Surgical Findings:   Small left lateral hypertrophied anal papilla.  Large grade 3 hemorrhoids in the right anterior, right posterior and left lateral positions with both internal and external components.  Longitudinal white scarring along the right posterior anal canal.  Overall, no suspicious masses or lesions appreciated on external exam of the anus, digital rectal exam and anoscopy.    Description of Procedure:   Patient was brought to the operating room on the transport cart.  Bilateral sequential compression devices were placed. Preoperative antibiotics were given.  Patient was induced under general endotracheal anesthesia.  Patient was then carefully flipped prone onto the OR table with all pressure points well-padded.  Patient was positioned in prone jackknife with the buttocks retracted apart  with silk tape.  The anus and perianal skin were prepped and draped in the usual sterile fashion.  A timeout was performed.    I began with external exam of the anus, digital rectal exam and anoscopy using a Spann bivalve anoscope.  I discovered the findings as described above.  I decided to proceed with excision of the left lateral hypertrophied anal papilla.  The papilla was sharply excised with Metzenbaum scissors and passed off the field to be sent to pathology.  Hemostasis was achieved within the wound base using electrocautery.    Those there was no discrete mass or suspicious lesions, there was palpable firm white scar tissue along the right posterior anal canal.  I decided to biopsy this area at the level of the dentate line to rule out dysplasia.  A portion of the scarred mucosa at the right posterior dentate line was excised using Metzenbaum scissors.  The tissue was passed off the field to be sent to pathology.  Hemostasis was achieved within the wound base using electrocautery.  The anal canal was irrigated with warm saline solution and appeared hemostatic.    30 cc of 0.5% Marcaine with epinephrine was circumferentially injected around the anus for local anesthesia.  The skin was cleaned and dried and a dressing of 4 x 4 gauze, ABD pad, tape and underwear was applied.  Patient was carefully flipped back supine onto the transport cart, awakened from anesthesia, extubated and transferred to the postanesthesia care unit in stable condition.  All sponge, needle and instrument counts were correct at the end of the case.  I was present for the entire case.      Dinesh Mills MD  4/5/2024  10:33 AM

## 2024-04-05 NOTE — ANESTHESIA PREPROCEDURE EVALUATION
PRE-OP EVALUATION    Patient Name: Wilian King    Admit Diagnosis: Rectal mass [K62.89]    Pre-op Diagnosis: Rectal mass [K62.89]    ANAL EXAMINATION UNDER ANESTHESIA WITH ANOSCOPY POSSIBLE BIOPSIES    Anesthesia Procedure: ANAL EXAMINATION UNDER ANESTHESIA WITH ANOSCOPY POSSIBLE BIOPSIES    Surgeon(s) and Role:     * Dinesh Mills MD - Primary    Pre-op vitals reviewed.  Temp: 97.7 °F (36.5 °C)  Pulse: 69  Resp: 18  BP: 115/75  SpO2: 98 %  Body mass index is 33.43 kg/m².    Current medications reviewed.  Hospital Medications:   acetaminophen (Tylenol Extra Strength) tab 1,000 mg  1,000 mg Oral Once    scopolamine (Transderm-Scop) 1 MG/3DAYS patch 1 patch  1 patch Transdermal Once    lactated ringers infusion   Intravenous Continuous    heparin (Porcine) 5000 UNIT/ML injection 5,000 Units  5,000 Units Subcutaneous Once    ceFAZolin (Ancef) 2 g in 20mL IV syringe premix  2 g Intravenous Once       Outpatient Medications:     Medications Prior to Admission   Medication Sig Dispense Refill Last Dose    acyclovir 400 MG Oral Tab Take 1 tablet (400 mg total) by mouth 3 (three) times daily. For 3 to 4 days as directed. Complete fasting labs for future refills 30 tablet 0 prn    sildenafil 20 MG Oral Tab Take 0.5 tablets (10 mg total) by mouth daily as needed. 30 tablet 1     cyclobenzaprine 5 MG Oral Tab Take 1 tablet (5 mg total) by mouth nightly. (Patient taking differently: Take 1 tablet (5 mg total) by mouth nightly as needed.) 30 tablet 0     citalopram 10 MG Oral Tab TAKE 1 TABLET BY MOUTH EVERY DAY 90 tablet 3 4/4/2024    fluticasone propionate 50 MCG/ACT Nasal Suspension 1 spray by Nasal route as needed.   4/5/2024 at 0530    EPINEPHrine 0.3 MG/0.3ML Injection Solution Auto-injector Inject 0.3 mL (1 each total) into the muscle See Admin Instructions. Use as directed 1 each 3     dicyclomine 20 MG Oral Tab Take 1 tablet (20 mg total) by mouth 4 (four) times daily before meals and nightly. (Patient  taking differently: Take 1 tablet (20 mg total) by mouth 4 (four) times daily as needed.) 120 tablet 3 4/4/2024 at 1800    hydrocortisone 2.5 % External Cream Place 1 Application rectally 2 (two) times daily. 1 each 3        Allergies: Penicillins and Shellfish-derived products      Anesthesia Evaluation    Patient summary reviewed.    Anesthetic Complications  (-) history of anesthetic complications         GI/Hepatic/Renal                            (+) irritable bowel syndrome     Cardiovascular      ECG reviewed.  Exercise tolerance: good         (+) obesity                                       Endo/Other    Negative endo/other ROS.                              Pulmonary    Negative pulmonary ROS.                       Neuro/Psych    Negative neuro/psych ROS.                                  Past Surgical History:   Procedure Laterality Date    COLONOSCOPY N/A 2/15/2024    Procedure: COLONOSCOPY;  Surgeon: Rosalina Gay MD;  [facility]    HEMORRHOIDECTOMY      1985; interal hemorrhoids    HERNIA SURGERY      INGUINAL HERNIA REPAIR Bilateral 1991    TONSILLECTOMY       Social History     Socioeconomic History    Marital status: Single   Tobacco Use    Smoking status: Never    Smokeless tobacco: Never   Vaping Use    Vaping Use: Never used   Substance and Sexual Activity    Alcohol use: Yes     Comment: social; very rare    Drug use: Never     History   Drug Use Unknown     Available pre-op labs reviewed.               Airway      Mallampati: II  Mouth opening: 3 FB  TM distance: 4 - 6 cm  Neck ROM: full Cardiovascular    Cardiovascular exam normal.  Rhythm: regular  Rate: normal  (-) murmur   Dental    Dentition appears grossly intact         Pulmonary    Pulmonary exam normal.  Breath sounds clear to auscultation bilaterally.               Other findings              ASA: 2   Plan: general  NPO status verified and patient meets guidelines.    Post-procedure pain management plan discussed with surgeon  and patient.      Plan/risks discussed with: patient              Present on Admission:  **None**

## 2024-04-05 NOTE — ANESTHESIA PROCEDURE NOTES
Airway  Date/Time: 4/5/2024 9:38 AM  Urgency: elective      General Information and Staff    Patient location during procedure: OR  Anesthesiologist: Deven Mitchell MD  Resident/CRNA: Tony Pina CRNA  Performed: CRNA   Performed by: Tony Pina CRNA  Authorized by: Deven Mitchell MD      Indications and Patient Condition  Indications for airway management: anesthesia  Sedation level: deep  Preoxygenated: yes  Patient position: sniffing  Mask difficulty assessment: 2 - vent by mask + OA or adjuvant +/- NMBA    Final Airway Details  Final airway type: endotracheal airway      Successful airway: ETT  Cuffed: yes   Successful intubation technique: direct laryngoscopy  Endotracheal tube insertion site: oral  Blade: GlideScope  Blade size: #3  ETT size (mm): 7.5    Cormack-Lehane Classification: grade I - full view of glottis  Placement verified by: capnometry   Measured from: lips  ETT to lips (cm): 22  Number of attempts at approach: 1

## 2024-04-05 NOTE — ANESTHESIA POSTPROCEDURE EVALUATION
[facility]    Wilian King Patient Status:  Hospital Outpatient Surgery   Age/Gender 58 year old male MRN [id number]   Location [facility] POST ANESTHESIA CARE UNIT Attending Dinesh Mills MD   Hosp Day # 0 PCP Edgar Lala MD       Anesthesia Post-op Note    ANAL EXAMINATION UNDER ANESTHESIA WITH ANOSCOPY, excision of hypertrophy anal papilla, BIOPSIES    Procedure Summary       Date: 04/05/24 Room / Location:  MAIN OR 15 / [facility] MAIN OR    Anesthesia Start: 0934 Anesthesia Stop: 1037    Procedure: ANAL EXAMINATION UNDER ANESTHESIA WITH ANOSCOPY, excision of hypertrophy anal papilla, BIOPSIES (Anus) Diagnosis:       Rectal mass      (Rectal mass [K62.89])    Surgeons: Dinesh Mills MD Anesthesiologist: Deven Mitchell MD    Anesthesia Type: general ASA Status: 2            Anesthesia Type: general    Vitals Value Taken Time   /78 04/05/24 1039   Temp 97.8 04/05/24 1040   Pulse 94 04/05/24 1039   Resp 17 04/05/24 1039   SpO2 97 % 04/05/24 1039   Vitals shown include unfiled device data.    Patient Location: PACU    Anesthesia Type: general    Airway Patency: patent    Postop Pain Control: adequate    Mental Status: mildly sedated but able to meaningfully participate in the post-anesthesia evaluation    Nausea/Vomiting: none    Cardiopulmonary/Hydration status: stable euvolemic    Complications: no apparent anesthesia related complications    Postop vital signs: stable    Dental Exam: Unchanged from Preop    Patient to be discharged from PACU when criteria met.

## 2024-04-29 ENCOUNTER — OFFICE VISIT (OUTPATIENT)
Facility: LOCATION | Age: 59
End: 2024-04-29
Payer: COMMERCIAL

## 2024-04-29 VITALS — HEART RATE: 83 BPM | TEMPERATURE: 98 F

## 2024-04-29 DIAGNOSIS — K62.9 ABNORMALITY OF RECTUM: Primary | ICD-10-CM

## 2024-04-29 NOTE — PROGRESS NOTES
Follow Up Visit Note       Active Problems      1. Abnormality of rectum          Chief Complaint   Chief Complaint   Patient presents with    Post-Op     PO  -f/u (from surgery 4/5)  4/5 ANAL EXAMINATION UNDER ANESTHESIA WITH ANOSCOPY, excision of hypertrophy anal papilla, BIOPSIES           History of Present Illness  This is a very nice 58-year-old gentleman referred to me as a colorectal specialist with concern for an anorectal mass/abnormal tissue within the anal canal.  Patient had a positive FIT test and underwent a diagnostic colonoscopy with Dr. Gay at [place] on 2/15/2024.  This colonoscopy revealed 5 polyps, largest measuring 6 mm per report.  In addition, Dr. Gay reports \"A retroflexed view of the rectum revealed hemorrhoids and one prolapsing likely anal papillae but irregular tissue distally not biopsied given risk of pain and bleeding.\"  Dr. Gay recommending having the hemorrhoid/anal papilla evaluated by surgery.     Patient followed up with his primary care provider, Dr. Fletcher, and was told that there was concern for possible anal dysplasia/AIN. Dr. Fletcher ordered a CT scan of the abdomen and pelvis performed on 2/25/2024 showing, \"Nonspecific lobulated enhancing nodularity of the anal canal and enhancement of the right aspect of the more proximal anorectal region; these findings are not well assessed by CT. Correlation with endoscopic findings is suggested with potential follow-up MRI (3T rectal protocol) if warranted on clinical grounds.\"  Patient was also evaluated by Dr. Debbi Dueñas, a general surgeon at [facility].  Her exam with anoscopy showed \"Rectal internal and external hemorrhoids without bleeding, radial white raised anal lesion noted, anoscopy limited exam, hemorrhoids, no rectal ulceration of obvious mass.\"  Dr. Dueñas recommended a pelvic MRI and referral to colorectal surgery.     MRI performed at [facility] on 3/2/2024 showed \"Curvilinear peripherally enhancing lesion  involving the right lateral aspect of the anal canal measuring approximately 3.0 x 0.5 x 1.7 cm.  The lesion appears to be centered within the submucosa of the anal canal with abutment of the mucosal surface at 9 o'clock inferiorly.  A thrombosed/superinflamed hemorrhoid is a possibility.  A simple linear perianal fissure is also a consideration, however there does not appear to be extension into the intersphincteric space.  While not entirely excluded, note the appearance is extremely atypical for a rectal cancer.  Correlate clinically with recent endoscopy results.\"    Most recently, I took the patient for anal exam under anesthesia with anoscopy, excision of hypertrophied anal papilla and biopsy on 4/5/2024. I discovered a small left lateral hypertrophied anal papilla.  Large grade 3 hemorrhoids in the right anterior, right posterior and left lateral positions with both internal and external components.  Longitudinal white scarring along the right posterior anal canal.  Overall, no suspicious masses or lesions appreciated on external exam of the anus, digital rectal exam and anoscopy during this procedure.     Patient returns for follow-up today.  He is not experiencing any anorectal symptoms.  He has noticed that his hemorrhoids have become bigger and more enlarged over the last several months.  His only concern is that they do limit his ability to participate in anal receptive intercourse.  Otherwise, they are not bothering him to the point where he would consider repeat hemorrhoidectomy.      Allergies  Wilian is allergic to penicillins and shellfish-derived products.    Past Medical / Surgical / Social / Family History    The past medical and past surgical history have been reviewed by me today.    Past Medical History:    H/O vasectomy    1995; reversal [id number]    IBS (irritable bowel syndrome)     Past Surgical History:   Procedure Laterality Date    Colonoscopy N/A 2/15/2024    Procedure: COLONOSCOPY;   Surgeon: Rosalina Gay MD;  Location: [facility] ENDO    Hemorrhoidectomy      1985; interal hemorrhoids    Hernia surgery      Inguinal hernia repair Bilateral 1991    Tonsillectomy         The family history and social history have been reviewed by me today.    Family History   Problem Relation Age of Onset    Heart Disorder Father     Cancer Father 51        Prostate Cancer, treated with seeds    Psychiatric Mother         Anxiety, Depression    Heart Disorder Maternal Grandfather     Cancer Paternal Grandmother         Stomach cancer    Heart Disorder Paternal Grandfather      Social History     Socioeconomic History    Marital status: Single   Tobacco Use    Smoking status: Never    Smokeless tobacco: Never   Vaping Use    Vaping status: Never Used   Substance and Sexual Activity    Alcohol use: Yes     Comment: social; very rare    Drug use: Never        Current Outpatient Medications:     HYDROcodone-acetaminophen 5-325 MG Oral Tab, Take 1 tablet by mouth every 6 (six) hours as needed for Pain., Disp: 12 tablet, Rfl: 0    acyclovir 400 MG Oral Tab, Take 1 tablet (400 mg total) by mouth 3 (three) times daily. For 3 to 4 days as directed. Complete fasting labs for future refills, Disp: 30 tablet, Rfl: 0    sildenafil 20 MG Oral Tab, Take 0.5 tablets (10 mg total) by mouth daily as needed., Disp: 30 tablet, Rfl: 1    cyclobenzaprine 5 MG Oral Tab, Take 1 tablet (5 mg total) by mouth nightly. (Patient taking differently: Take 1 tablet (5 mg total) by mouth nightly as needed.), Disp: 30 tablet, Rfl: 0    citalopram 10 MG Oral Tab, TAKE 1 TABLET BY MOUTH EVERY DAY, Disp: 90 tablet, Rfl: 3    fluticasone propionate 50 MCG/ACT Nasal Suspension, 1 spray by Nasal route as needed., Disp: , Rfl:     EPINEPHrine 0.3 MG/0.3ML Injection Solution Auto-injector, Inject 0.3 mL (1 each total) into the muscle See Admin Instructions. Use as directed, Disp: 1 each, Rfl: 3    dicyclomine 20 MG Oral Tab, Take 1 tablet (20 mg total) by  mouth 4 (four) times daily before meals and nightly. (Patient taking differently: Take 1 tablet (20 mg total) by mouth 4 (four) times daily as needed.), Disp: 120 tablet, Rfl: 3    hydrocortisone 2.5 % External Cream, Place 1 Application rectally 2 (two) times daily., Disp: 1 each, Rfl: 3     Review of Systems  A 10 point review of systems was performed and negative unless otherwise documented per HPI.     Physical Findings   Pulse 83   Temp 98.3 °F (36.8 °C)   Physical Exam  Vitals and nursing note reviewed. Exam conducted with a chaperone present.   Constitutional:       General: He is not in acute distress.  HENT:      Head: Normocephalic and atraumatic.      Mouth/Throat:      Mouth: Mucous membranes are moist.   Cardiovascular:      Rate and Rhythm: Normal rate and regular rhythm.   Pulmonary:      Effort: Pulmonary effort is normal.   Abdominal:      General: There is no distension.      Palpations: Abdomen is soft.      Tenderness: There is no abdominal tenderness.   Genitourinary:     Comments: Deferred  Musculoskeletal:         General: No deformity.   Skin:     General: Skin is warm and dry.   Neurological:      General: No focal deficit present.      Mental Status: He is alert.   Psychiatric:         Mood and Affect: Mood normal.     Pathology reviewed with patient as below  Final Diagnosis:   A.  Anal papilla, biopsy:  -Polypoid squamous and colonic tissue with focal acute and chronic inflammation.    -Negative for malignancy.       B.  Right posterior dentate line biopsy:  -Squamous and colonic lined tissue with submucosal fibrosis.  -Negative for malignancy.        Assessment   1. Abnormality of rectum      This is a very nice 58-year-old gentleman referred to me as a colorectal specialist with concern for an anorectal mass/abnormal tissue within the anal canal.  Patient had a positive FIT test and underwent a diagnostic colonoscopy with Dr. Gay at [place] on 2/15/2024.  This colonoscopy revealed 5  polyps, largest measuring 6 mm per report.  In addition, Dr. Gay reports \"A retroflexed view of the rectum revealed hemorrhoids and one prolapsing likely anal papillae but irregular tissue distally not biopsied given risk of pain and bleeding.\"  Dr. Gay recommending having the hemorrhoid/anal papilla evaluated by surgery.     Patient followed up with his primary care provider, Dr. Fletcher, and was told that there was concern for possible anal dysplasia/AIN. Dr. Fletcher ordered a CT scan of the abdomen and pelvis performed on 2/25/2024 showing, \"Nonspecific lobulated enhancing nodularity of the anal canal and enhancement of the right aspect of the more proximal anorectal region; these findings are not well assessed by CT. Correlation with endoscopic findings is suggested with potential follow-up MRI (3T rectal protocol) if warranted on clinical grounds.\"  Patient was also evaluated by Dr. Debbi Dueñas, a general surgeon at [facility].  Her exam with anoscopy showed \"Rectal internal and external hemorrhoids without bleeding, radial white raised anal lesion noted, anoscopy limited exam, hemorrhoids, no rectal ulceration of obvious mass.\"  Dr. Dueñas recommended a pelvic MRI and referral to colorectal surgery.     MRI performed at [place] [facility] on 3/2/2024 showed \"Curvilinear peripherally enhancing lesion involving the right lateral aspect of the anal canal measuring approximately 3.0 x 0.5 x 1.7 cm.  The lesion appears to be centered within the submucosa of the anal canal with abutment of the mucosal surface at 9 o'clock inferiorly.  A thrombosed/superinflamed hemorrhoid is a possibility.  A simple linear perianal fissure is also a consideration, however there does not appear to be extension into the intersphincteric space.  While not entirely excluded, note the appearance is extremely atypical for a rectal cancer.  Correlate clinically with recent endoscopy results.\"    Most recently, I took the patient for anal exam  under anesthesia with anoscopy, excision of hypertrophied anal papilla and biopsy on 4/5/2024. I discovered a small left lateral hypertrophied anal papilla.  Large grade 3 hemorrhoids in the right anterior, right posterior and left lateral positions with both internal and external components.  Longitudinal white scarring along the right posterior anal canal.  Overall, no suspicious masses or lesions appreciated on external exam of the anus, digital rectal exam and anoscopy during this procedure.     Pathology from the anal papilla and right posterior dentate line biopsies were benign without any evidence of dysplasia or malignancy.    Patient returns for follow-up today.  He is not experiencing any anorectal symptoms.  He has noticed that his hemorrhoids have become bigger and more enlarged over the last several months.  His only concern is that they do limit his ability to participate in anal receptive intercourse.  Otherwise, they are not bothering him to the point where he would consider repeat hemorrhoidectomy.    Plan   I counseled patient that at this point his anal canal and rectum have been thoroughly investigated endoscopically, with MRI and was recently through anal exam under anesthesia with anoscopy.  There is no evidence to suggest malignancy or even dysplasia.    In regards to the recurrent enlarged hemorrhoids with both internal and external components, I advised continuing a high-fiber diet, considering a daily fiber supplement, drinking at least 64 ounces of fluids daily and limiting toilet time/straining.  He can take sitz bath's and/or use over-the-counter hemorrhoid medication as needed.  He was not interested in considering any further procedural or surgical intervention for the hemorrhoids at this time.    No further follow-up scheduled, but patient is welcome to see me with any future anorectal or surgical questions/concerns.  Patient instructed to follow-up with Dr. Dueñas in regards to the  left adrenal lesion seen on his CT scan on 2/25/2024.  Patient expressed understanding and was agreeable to plan.     No orders of the defined types were placed in this encounter.      Imaging & Referrals   None    Follow Up  No follow-ups on file.    Dinesh Mills MD

## 2024-06-25 RX ORDER — CITALOPRAM HYDROBROMIDE 10 MG/1
TABLET ORAL
Qty: 90 TABLET | Refills: 3 | Status: SHIPPED | OUTPATIENT
Start: 2024-06-25

## 2024-06-25 NOTE — TELEPHONE ENCOUNTER
Refill request is for a maintenance medication and has met the criteria specified in the Ambulatory Medication Refill Standing Order for eligibility, visits, laboratory, alerts and was sent to the requested pharmacy.    Requested Prescriptions     Signed Prescriptions Disp Refills    CITALOPRAM 10 MG Oral Tab 90 tablet 3     Sig: TAKE 1 TABLET BY MOUTH EVERY DAY     Authorizing Provider: MOOSE ANNA     Ordering User: DARYL RAMIREZ

## 2024-06-26 RX ORDER — DICYCLOMINE HCL 20 MG
20 TABLET ORAL
Qty: 120 TABLET | Refills: 3 | Status: SHIPPED | OUTPATIENT
Start: 2024-06-26

## 2024-06-26 RX ORDER — DICYCLOMINE HCL 20 MG
20 TABLET ORAL
Qty: 120 TABLET | Refills: 3 | OUTPATIENT
Start: 2024-06-26

## 2024-06-26 NOTE — TELEPHONE ENCOUNTER
Current refill request refused due to refill is either a duplicate request or has active refills at the pharmacy.  Check previous templates.    Requested Prescriptions     Refused Prescriptions Disp Refills    dicyclomine 20 MG Oral Tab 120 tablet 3     Sig: Take 1 tablet (20 mg total) by mouth 4 (four) times daily before meals and nightly.     Refused By: DARYL RAMIREZ     Reason for Refusal: Duplicate refill request

## 2024-06-26 NOTE — TELEPHONE ENCOUNTER
Refill request is for a maintenance medication and has met the criteria specified in the Ambulatory Medication Refill Standing Order for eligibility, visits, laboratory, alerts and was sent to the requested pharmacy.    Requested Prescriptions     Signed Prescriptions Disp Refills    dicyclomine 20 MG Oral Tab 120 tablet 3     Sig: TAKE 1 TABLET (20 MG TOTAL) BY MOUTH 4 (FOUR) TIMES DAILY BEFORE MEALS AND NIGHTLY.     Authorizing Provider: MOOSE ANNA     Ordering User: DARYL RAMIREZ

## 2024-07-16 RX ORDER — SILDENAFIL CITRATE 20 MG/1
10 TABLET ORAL
Qty: 30 TABLET | Refills: 5 | Status: SHIPPED | OUTPATIENT
Start: 2024-07-16

## 2024-07-16 NOTE — TELEPHONE ENCOUNTER
Refill request is for a maintenance medication and has met the criteria specified in the Ambulatory Medication Refill Standing Order for eligibility, visits, laboratory, alerts and was sent to the requested pharmacy.    Requested Prescriptions     Signed Prescriptions Disp Refills    sildenafil 20 MG Oral Tab 30 tablet 5     Sig: Take 0.5 tablets (10 mg total) by mouth daily as needed.     Authorizing Provider: MOOSE ANNA     Ordering User: OMAR GRIDER

## 2024-07-17 NOTE — TELEPHONE ENCOUNTER
Received faxed Prior Authorization Request from [facility] for Sildenafil.     Patient ID#[id number]  Phone# [phone]    Form placed in purple folder.

## 2024-08-19 NOTE — PATIENT INSTRUCTIONS
- You were seen in clinic for regular annual check-up. We have ordered labs for you and we will call you with the results. Please obtain the bloodwork fasting for 10**12 hours. OK to drink water the day of your blood draw.      We have the lab downstairs on the first floor.  No appointment is necessary.  Lab hours are Monday-Friday 7:30 AM to 4:45 PM.  There may be Saturday hours 7 AM-11:00 AM as well.     Otherwise you can obtain the blood tests on the weekends at the main hospital or local immediate care/urgent care within the [facility].    - These are likely mite bites and typically resolve over time.  We can treat this symptomatically with topical Benadryl 1 application twice a day as needed  - If needed, can use Zyrtec/Claritin/Allegra once a day for further itching support  - The low back spot seems to be seborrheic keratosis or stuck on excessive keratin buildup.  We will try triamcinolone application twice a day to the area  If needed, can see dermatology for definitive evaluation    Lets also continue the same regimen for the toenail fungal infection.  This seems to be improving since our last visit.    -We did review your workup with Dr. Mills and thankfully biopsies were negative for cancerous process in the anorectal area.  Monitor for changes of the hemorrhoids however.  Keep up with good fiber intake.  Hemorrhoidectomy can be considered in the future if needed  - Next colonoscopy will be due 2027  - We are keeping close eye on your PSA levels, will repeat with blood test.  Follow-up with urology as needed  -Please continue checking your blood pressures at home and notify us if they are increasing  -Monitor for changes of work-related stress, anxiety, depression.  - Vaccines may be due for: COVID dose #3, We recommended checking with your insurance for coverage of Shingrix, a 2-dose shingles vaccine that can be obtained at the pharmacy if there is adequate coverage through your insurance  plan.  - Please continue to eat a varied diet including recommended servings of vegetables, fruits, and low fat dairy. Minimize high saturated fats (such as fast foods) and high sugar intake (such as soda)  - We recommend 150 minutes of moderate intensity exercise (brisk walking, swimming) weekly to maintain your current weight.  Targeted weight loss will require more vigorous exercise or more than 150 minutes/week.    Return to clinic in 6 months for follow-up

## 2024-08-19 NOTE — H&P
Wilian King is a 58 year old male.    HPI:     Chief Complaint   Patient presents with    Physical     Here today for Annual Physical      Mr. KING is a 58 year old male PMHX irritable bowel syndrome, anxiety coming in for annual physical examination.    Overall he is doing well. No pain. BMs wax and wane.     Still with stress with work. He has a good rapport with his team.     Hobbies - gardening, tomatoes. Yardwork, travel for Fall-Winter.     6-7 hours of sleep. 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    Has bites 2 spots in the front and back.  His partner is concerned about a spot in the low back, it is itchy, but has not changed otherwise.  No pain to the area.    I reviewed and updated the PMHx, FamHx, medications, allergies, and SocHx as below with the patient    SocHX  - Home: feels safe at home; partner  - Work: feels safe at work;  at the [employer]  - Sexual Activity: with partner  - Hobbies: cooking, walking, gardening/yardwork, fantasy football.   - Nutrition: types of food, veggies and fruits.  Protein, nuts  - Physical Activity:  2 miles every morning. Been doing  high protein, more walks 2-3 times a week at night      HISTORY:  Past Medical History:    H/O vasectomy    1995; reversal [id number]    IBS (irritable bowel syndrome)      Past Surgical History:   Procedure Laterality Date    Colonoscopy N/A 2/15/2024    Procedure: COLONOSCOPY;  Surgeon: Rosalina Gay MD;  Location: [facility]    Hemorrhoidectomy      1985; interal hemorrhoids    Hernia surgery      Inguinal hernia repair Bilateral 1991    Tonsillectomy        Family History   Problem Relation Age of Onset    Heart Disorder Father     Cancer Father 51        Prostate Cancer, treated with seeds    Psychiatric Mother         Anxiety, Depression    Heart Disorder Maternal Grandfather     Cancer Paternal Grandmother         Stomach cancer    Heart Disorder Paternal Grandfather       Social History:   Social  History     Socioeconomic History    Marital status: Single   Tobacco Use    Smoking status: Never    Smokeless tobacco: Never   Vaping Use    Vaping status: Never Used   Substance and Sexual Activity    Alcohol use: Yes     Comment: social; very rare    Drug use: Never        Medications (Active prior to today's visit):  Current Outpatient Medications   Medication Sig Dispense Refill    sildenafil 20 MG Oral Tab Take 0.5 tablets (10 mg total) by mouth daily as needed. 30 tablet 5    dicyclomine 20 MG Oral Tab TAKE 1 TABLET (20 MG TOTAL) BY MOUTH 4 (FOUR) TIMES DAILY BEFORE MEALS AND NIGHTLY. 120 tablet 3    CITALOPRAM 10 MG Oral Tab TAKE 1 TABLET BY MOUTH EVERY DAY 90 tablet 3    acyclovir 400 MG Oral Tab Take 1 tablet (400 mg total) by mouth 3 (three) times daily. For 3 to 4 days as directed. Complete fasting labs for future refills 30 tablet 0    fluticasone propionate 50 MCG/ACT Nasal Suspension 1 spray by Nasal route as needed.      EPINEPHrine 0.3 MG/0.3ML Injection Solution Auto-injector Inject 0.3 mL (1 each total) into the muscle See Admin Instructions. Use as directed 1 each 3    hydrocortisone 2.5 % External Cream Place 1 Application rectally 2 (two) times daily. 1 each 3       Allergies:  Allergies   Allergen Reactions    Penicillins HIVES    Shellfish-Derived Products ANAPHYLAXIS         ROS:   Positive Findings indicated in BOLD    Constitutional: Fever, Chills, Weight Gain, Weight Loss, Night Sweats, Fatigue, Malaise  ENT/Mouth:  Hearing Changes, Ear Pain, Nasal Congestion, Sinus Pain, Hoarseness, Sore throat, Rhinorrhea, Swallowing Difficulty  Eyes: Eye Pain, Swelling, Redness, Foreign Body, Discharge, Vision Changes  Cardiovascular: Chest Pain, SOB, PND, Dyspnea on Exertion, Orthopnea, Claudication, Edema, Palpitations  Respiratory: Cough, Sputum, Wheezing, Shortness of breath  Gastrointestinal: Nausea, Vomiting, Diarrhea, Constipation, Pain, Heartburn, Dysphagia, Bloody stools, Tarry  stools  Genitourinary: Dysmenorrhea, Dysuria, Urinary Frequency, Hematuria, Urinary Incontinence, Urgency,  Flank Pain  Musculoskeletal: Arthralgias, Myalgias, Joint Swelling, Joint Stiffness, Back Pain, Neck Pain  Integumentary: Skin Lesions, Pruritis, Hair Changes, Jaundice, Nail changes  Neuro: Weakness, Numbness, Paresthesias, Loss of Consciousness, Syncope, Dizziness, Headache, Falls  Psych: Anxiety, Depression, Insomnia, Suicidal Ideation, Homicidal ideation, Memory Changes  Heme/Lymph: Bruising, Bleeding, Lymphadenopathy  Endocrine: Polyuria, Polydipsia, Temperature Intolerance    PHYSICAL EXAM:   Vital Signs:  Blood pressure 128/82, pulse 85, height 6' 1\" (1.854 m), weight 253 lb 4 oz (114.9 kg), SpO2 95%.     Constitutional: No acute distress. Alert and oriented x 3.  Eyes: EOMI, PERRLA, clear sclera b/l  HENT: NCAT, Moist mucous membranes, Oropharynx without erythema or exudates  Neck: No JVD, no thyromegaly  Cardiovascular: S1, S2, no S3, no S4, Regular rate and rhythm, No murmurs/gallops/rubs.   Vascular: Equal pulses 2+ carotids no bruits or thrills/radial/DP/PT bilaterally  Respiratory: Clear to auscultation bilaterally.  No wheezes/rales/rhonchi  Gastrointestinal: Soft, nontender, nondistended. Positive bowel sounds x 4. No rebound tenderness. No hepatomegaly, No splenomegaly  Genitourinary: No CVA tenderness bilaterally  Neurologic: No focal neurological deficits, CN II-XII intact, MSK Strength 5/5 and symmetric in all extremities, normal gait, 2+ patellar tendon, bilateral toe proprioception intact  Musculoskeletal: Full range of motion of all extremities, no clubbing/swelling/edema  Skin:   Scattered areas of heterogeneous skin lesions, secondary excoriations without active cellulitis  There is a papule, stuck on appearance on the low back  Psychiatric: Appropriate mood and affect  Heme/Lymph/Immune: No cervical LAD      DATA REVIEWED   Labs:  No results found for this or any previous visit (from  the past 8760 hour(s)).      No results found for this or any previous visit (from the past 8760 hour(s)).      Cholesterol, Total (mg/dL)   Date Value   08/15/2023 168     HDL Cholesterol (mg/dL)   Date Value   08/15/2023 42     LDL Cholesterol (mg/dL)   Date Value   08/15/2023 115 (H)     Triglycerides (mg/dL)   Date Value   08/15/2023 58       Last A1c value was 5.5% done 8/15/2023.    CT abdomen and pelvis 2/25/2024    Impression   CONCLUSION:  1. Nonspecific lobulated enhancing nodularity of the anal canal and enhancement of the right aspect of the more proximal anorectal region; these findings are not well assessed by CT. Correlation with endoscopic findings is suggested with potential  follow-up MRI (3T rectal protocol) if warranted on clinical grounds.     2. There is a left adrenal lesion with indeterminate attenuation characteristics. Follow-up MRI of the abdomen (adrenal protocol) with and without contrast is recommended for further assessment.     3. Otherwise, no acute intra-abdominal process is identified. No additional etiology of the patient's symptoms is appreciated from this study.     4. Hepatomegaly with underlying hepatic steatosis.     5. Lesser incidental findings as above.        MRI rectal cancer 3T - 3/2/2024    Impression   CONCLUSION:  1. Curvilinear peripherally enhancing lesion involving the right lateral aspect of the anal canal measuring approximately 3.0 x 0.5 x 1.7 cm.  The lesion appears to be centered within the submucosa of the anal canal with abutment of the mucosal surface  at 9 o'clock inferiorly.  A thrombosed/superinflamed hemorrhoid is a possibility.  A simple linear perianal fissure is also a consideration, however there does not appear to be extension into the intersphincteric space.  While not entirely excluded, note   the appearance is extremely atypical for a rectal cancer.  Correlate clinically with recent endoscopy results.  2. Mild right hip joint osteoarthritis  with a multiloculated 3.8 cm paralabral cyst protruding into the right gluteus minimus muscle, the latter of which is suspicious for an underlying anterior superior right hip labral tear.           Endoscopy Anoscopy 4/5/2024  Final Diagnosis:   A.  Anal papilla, biopsy:  -Polypoid squamous and colonic tissue with focal acute and chronic inflammation.    -Negative for malignancy.       B.  Right posterior dentate line biopsy:  -Squamous and colonic lined tissue with submucosal fibrosis.  -Negative for malignancy.       ASSESSMENT/PLAN:   Skin lesions  - Scattered Heterogeneous bites of the neck, back, no active cellulitis  - These are likely mite bites and typically resolve over time.  We can treat this symptomatically with topical Benadryl 1 application twice a day as needed  - If needed, can use Zyrtec/Claritin/Allegra once a day for further itching support  - The low back spot seems to be seborrheic keratosis or stuck on excessive keratin buildup.  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 Gay fragments of tubular adenoma x 5 noted, for which repeat colonoscopy is recommended in 3 years  - There was concern for possible AIN that is connected to an internal hemorrhoid along the rectal canal.  - This was not seen on the prior colonoscopy report from 2018.  - CT 2/25/2024: Showed nonspecific lobulated enhancing nodularity of anal canal enhancement of the right aspect of the more proximal anorectal region  - MRI 3/2/2024: Curvilinear peripherally enhancing lesion involving right lateral aspect measuring 3.0 x 0.5 x 1.7 cm, centered within the submucosa of the anal canal with abutment of the mucosal surface.  - Was evaluated by colorectal surgery, Dr. Mills 4/29/2024  -evaluated further with endoscopy, excision of hypertrophied anal papilla, large grade 3 hemorrhoids noted.  Biopsy sites were benign without evidence of dysplasia.  - Next follow-up colonoscopy will be in 3 years, 2027 with Dr. Martinez     Anxiety  PTSD  Recall that the patient.  Had been struggling with work-related stress resulting in anxiety, depression, insomnia.  He is showed significant improvement by working with a therapist, medical management, and focusing on himself. Specifically, he is worked on getting more assistance at work, trying to not overextend himself by taking on multiple projects.  It seems his work has been responsive on making positive changes, namely an  that he enjoys working with.  - Outside of work, he has been receiving good support from his family.  He is taking time for leisure activities such as traveling and reading recreationally.  - Staying active with exercise  - Continues to maintain good control of her symptoms.  Focusing on self-care, enjoying his daughter's wedding, and has a more supportive team at work.  He is achieving his work goals and overall thriving since our last visit     Hyperlipidemia  ASCVD 4.5%, borderline need for statin,   - Continue with good optimization of nutrition  - Exercising as tolerated  -Repeat lipid panel     Vitamin D deficiency  Mildly deficient  - Recommended increasing to 4000 units of vitamin D3 over-the-counter, may help with symptoms of anxiety     Paresthesias  Chronic, intermittent.  Does have some intermittent back pain.  Noticeable decrease in vibration sense worse on the right side, cool temperature/light touch worse on the left side.  Broad differential, should rule out metabolic disorder such as thyroid disease, diabetes.  I favor possible spondylosis with impingement of the sciatic nerves bilaterally  -  Secondary work-up largely unremarkable  - X-ray lumbar spine did show degenerative disc and facet disease  throughout the lumbar spine most prominent at L4-L5 and L5-S1  - Seems to come and go, pending work-up we may consider neuropathic medication such as gabapentin versus Lyrica versus Cymbalta  -Can also consider EMG if symptoms are worsening.  - Symptoms seem to be improving, now just affecting the toes rather than the whole foot.     IBS  Diarrhea predominant.  .  Intermittent hematochezia that is self-limited  -Can start a food log and determine any triggering factors  - On dicyclomine as needed  -Still seems to be controlled but controlled on dicyclomine as needed     Elevated PSA  PSA 4.76; reported biopsies were benign  -PSA down to 2.00     Internal hemorrhoids  2 noted on examination today, FOBT negative  - Continue high-fiber intake, try to control IBS  - Check FIT Test: negative 8/12/2022  - Grade 3 large hemorrhoids noted on anoscopy, possible hemorrhoidectomy to be considered in the future.     Onychomycosis  - We will perform trial of Lamisil first; some improvement since last visit.  We will continue with his current management      HSV-1 infection  - Refilled acyclovir on as-needed basis on this visit     Shellfish allergy  - Has PRN EpiPen     Erectile dysfunction  - Currently on sildenafil, 10 mg/half tablet once a day.            Orders This Visit:  No orders of the defined types were placed in this encounter.      Meds This Visit:  Requested Prescriptions      No prescriptions requested or ordered in this encounter       Imaging & Referrals:  None       Health Maintenance  HTN Screen: BP at goal  DM Screen: Will check  HLD Screen: Will check  HCV Screen: Considered low risk  HIV Screen: considered low risk  G/C/Syphilis: Considered low risk    Colon Cancer Screening (45-70): UTD, next due 2027  Prostate Cancer Screening: (50-70): UTD  Lung Cancer Screening (55-79 with 30 p/year and active < 15 years): Not indicated  AAA Screening (65-75 Hx of smoking): Not indicated    Influenza: Annually   Td/Tdap:  Last Tdap - UTD  Zoster (50+): Recommended 2 doses Shringrix  HPV (19-26): Not indicated  Pneumococcal: Not indicated    Immunization History   Administered Date(s) Administered    Covid-19 Vaccine Pfizer 30 mcg/0.3 ml 12/23/2020, 01/13/2021    FLUZONE 6 months and older PFS 0.5 ml (70508) 02/20/2019    Influenza 12/01/2019, 11/06/2023    TDAP 08/01/2022       Return to clinic in 6 months for follow-up    Edgar Lala MD, 08/20/24, 4:14 PM

## 2024-08-20 ENCOUNTER — OFFICE VISIT (OUTPATIENT)
Dept: INTERNAL MEDICINE CLINIC | Facility: CLINIC | Age: 59
End: 2024-08-20

## 2024-08-20 VITALS
DIASTOLIC BLOOD PRESSURE: 82 MMHG | SYSTOLIC BLOOD PRESSURE: 128 MMHG | HEIGHT: 73 IN | HEART RATE: 85 BPM | OXYGEN SATURATION: 95 % | WEIGHT: 253.25 LBS | BODY MASS INDEX: 33.56 KG/M2

## 2024-08-20 DIAGNOSIS — K58.9 IRRITABLE BOWEL SYNDROME, UNSPECIFIED TYPE: ICD-10-CM

## 2024-08-20 DIAGNOSIS — Z13.29 SCREENING FOR THYROID DISORDER: ICD-10-CM

## 2024-08-20 DIAGNOSIS — Z13.0 SCREENING FOR DEFICIENCY ANEMIA: ICD-10-CM

## 2024-08-20 DIAGNOSIS — F41.9 ANXIETY: ICD-10-CM

## 2024-08-20 DIAGNOSIS — E78.5 BORDERLINE HYPERLIPIDEMIA: ICD-10-CM

## 2024-08-20 DIAGNOSIS — Z00.00 ANNUAL PHYSICAL EXAM: Primary | ICD-10-CM

## 2024-08-20 DIAGNOSIS — Z13.1 SCREENING FOR DIABETES MELLITUS: ICD-10-CM

## 2024-08-20 DIAGNOSIS — Z12.5 SCREENING FOR PROSTATE CANCER: ICD-10-CM

## 2024-08-20 DIAGNOSIS — E55.9 VITAMIN D DEFICIENCY: ICD-10-CM

## 2024-08-20 PROCEDURE — 3079F DIAST BP 80-89 MM HG: CPT | Performed by: INTERNAL MEDICINE

## 2024-08-20 PROCEDURE — 3008F BODY MASS INDEX DOCD: CPT | Performed by: INTERNAL MEDICINE

## 2024-08-20 PROCEDURE — 99396 PREV VISIT EST AGE 40-64: CPT | Performed by: INTERNAL MEDICINE

## 2024-08-20 PROCEDURE — 3074F SYST BP LT 130 MM HG: CPT | Performed by: INTERNAL MEDICINE

## 2024-08-20 RX ORDER — TRIAMCINOLONE ACETONIDE 1 MG/G
CREAM TOPICAL 2 TIMES DAILY
Qty: 45 G | Refills: 1 | Status: SHIPPED | OUTPATIENT
Start: 2024-08-20

## 2024-08-24 ENCOUNTER — LAB ENCOUNTER (OUTPATIENT)
Dept: LAB | Age: 59
End: 2024-08-24
Attending: INTERNAL MEDICINE
Payer: COMMERCIAL

## 2024-08-24 DIAGNOSIS — Z13.1 SCREENING FOR DIABETES MELLITUS: ICD-10-CM

## 2024-08-24 DIAGNOSIS — K58.9 IRRITABLE BOWEL SYNDROME, UNSPECIFIED TYPE: ICD-10-CM

## 2024-08-24 DIAGNOSIS — Z13.29 SCREENING FOR THYROID DISORDER: ICD-10-CM

## 2024-08-24 DIAGNOSIS — E55.9 VITAMIN D DEFICIENCY: ICD-10-CM

## 2024-08-24 DIAGNOSIS — E78.5 BORDERLINE HYPERLIPIDEMIA: ICD-10-CM

## 2024-08-24 DIAGNOSIS — R20.2 PARESTHESIAS: ICD-10-CM

## 2024-08-24 DIAGNOSIS — Z12.5 SCREENING FOR PROSTATE CANCER: ICD-10-CM

## 2024-08-24 DIAGNOSIS — Z13.0 SCREENING FOR DEFICIENCY ANEMIA: ICD-10-CM

## 2024-08-24 DIAGNOSIS — Z00.00 ANNUAL PHYSICAL EXAM: ICD-10-CM

## 2024-08-24 LAB
ALBUMIN SERPL-MCNC: 4.6 G/DL (ref 3.2–4.8)
ALBUMIN/GLOB SERPL: 1.6 {RATIO} (ref 1–2)
ALP LIVER SERPL-CCNC: 62 U/L
ALT SERPL-CCNC: 28 U/L
ANION GAP SERPL CALC-SCNC: 5 MMOL/L (ref 0–18)
AST SERPL-CCNC: 23 U/L (ref ?–34)
BASOPHILS # BLD AUTO: 0.06 X10(3) UL (ref 0–0.2)
BASOPHILS NFR BLD AUTO: 1 %
BILIRUB SERPL-MCNC: 1.4 MG/DL (ref 0.3–1.2)
BUN BLD-MCNC: 13 MG/DL (ref 9–23)
BUN/CREAT SERPL: 13.3 (ref 10–20)
CALCIUM BLD-MCNC: 9.8 MG/DL (ref 8.7–10.4)
CHLORIDE SERPL-SCNC: 110 MMOL/L (ref 98–112)
CHOLEST SERPL-MCNC: 205 MG/DL (ref ?–200)
CO2 SERPL-SCNC: 28 MMOL/L (ref 21–32)
COMPLEXED PSA SERPL-MCNC: 0.83 NG/ML (ref ?–4)
CREAT BLD-MCNC: 0.98 MG/DL
DEPRECATED RDW RBC AUTO: 41.2 FL (ref 35.1–46.3)
EGFRCR SERPLBLD CKD-EPI 2021: 90 ML/MIN/1.73M2 (ref 60–?)
EOSINOPHIL # BLD AUTO: 0.26 X10(3) UL (ref 0–0.7)
EOSINOPHIL NFR BLD AUTO: 4.5 %
ERYTHROCYTE [DISTWIDTH] IN BLOOD BY AUTOMATED COUNT: 12.6 % (ref 11–15)
FASTING PATIENT LIPID ANSWER: YES
FASTING STATUS PATIENT QL REPORTED: YES
GLOBULIN PLAS-MCNC: 2.8 G/DL (ref 2–3.5)
GLUCOSE BLD-MCNC: 105 MG/DL (ref 70–99)
HCT VFR BLD AUTO: 48.6 %
HDLC SERPL-MCNC: 40 MG/DL (ref 40–59)
HGB BLD-MCNC: 15.9 G/DL
IMM GRANULOCYTES # BLD AUTO: 0.04 X10(3) UL (ref 0–1)
IMM GRANULOCYTES NFR BLD: 0.7 %
LDLC SERPL CALC-MCNC: 153 MG/DL (ref ?–100)
LYMPHOCYTES # BLD AUTO: 1.95 X10(3) UL (ref 1–4)
LYMPHOCYTES NFR BLD AUTO: 33.4 %
MCH RBC QN AUTO: 29.5 PG (ref 26–34)
MCHC RBC AUTO-ENTMCNC: 32.7 G/DL (ref 31–37)
MCV RBC AUTO: 90.2 FL
MONOCYTES # BLD AUTO: 0.51 X10(3) UL (ref 0.1–1)
MONOCYTES NFR BLD AUTO: 8.7 %
NEUTROPHILS # BLD AUTO: 3.02 X10 (3) UL (ref 1.5–7.7)
NEUTROPHILS # BLD AUTO: 3.02 X10(3) UL (ref 1.5–7.7)
NEUTROPHILS NFR BLD AUTO: 51.7 %
NONHDLC SERPL-MCNC: 165 MG/DL (ref ?–130)
OSMOLALITY SERPL CALC.SUM OF ELEC: 296 MOSM/KG (ref 275–295)
PLATELET # BLD AUTO: 238 10(3)UL (ref 150–450)
POTASSIUM SERPL-SCNC: 5.2 MMOL/L (ref 3.5–5.1)
PROT SERPL-MCNC: 7.4 G/DL (ref 5.7–8.2)
RBC # BLD AUTO: 5.39 X10(6)UL
SODIUM SERPL-SCNC: 143 MMOL/L (ref 136–145)
TRIGL SERPL-MCNC: 68 MG/DL (ref 30–149)
TSI SER-ACNC: 1.24 MIU/ML (ref 0.55–4.78)
VIT D+METAB SERPL-MCNC: 23.5 NG/ML (ref 30–100)
VIT D+METAB SERPL-MCNC: 28.2 NG/ML (ref 30–100)
VLDLC SERPL CALC-MCNC: 13 MG/DL (ref 0–30)
WBC # BLD AUTO: 5.8 X10(3) UL (ref 4–11)

## 2024-08-24 PROCEDURE — 82306 VITAMIN D 25 HYDROXY: CPT

## 2024-08-24 PROCEDURE — 36415 COLL VENOUS BLD VENIPUNCTURE: CPT

## 2024-08-24 PROCEDURE — 85025 COMPLETE CBC W/AUTO DIFF WBC: CPT

## 2024-08-24 PROCEDURE — 84443 ASSAY THYROID STIM HORMONE: CPT

## 2024-08-24 PROCEDURE — 80053 COMPREHEN METABOLIC PANEL: CPT

## 2024-08-24 PROCEDURE — 80061 LIPID PANEL: CPT

## 2024-08-29 ENCOUNTER — TELEPHONE (OUTPATIENT)
Dept: INTERNAL MEDICINE CLINIC | Facility: CLINIC | Age: 59
End: 2024-08-29

## 2024-08-29 DIAGNOSIS — E78.2 MIXED HYPERLIPIDEMIA: Primary | ICD-10-CM

## 2024-08-29 NOTE — TELEPHONE ENCOUNTER
Please notify patient reviewed the blood work from 8/24  - The sugar levels and cholesterol did bump up from the last check, lets see if we can manage this conservatively with optimizing nutrition and exercising as he has been doing  - Lets plan to repeat another cholesterol panel within 3-4 months.  If still elevated, we may need to consider medications at that time  - Vitamin D is just slightly low, for which we should proceed with vitamin D supplementation 2000 units once a day    All the blood work looks good

## 2024-11-12 RX ORDER — DICYCLOMINE HCL 20 MG
20 TABLET ORAL
Qty: 120 TABLET | Refills: 3 | Status: SHIPPED | OUTPATIENT
Start: 2024-11-12

## 2024-11-12 NOTE — TELEPHONE ENCOUNTER
Refill request is for a maintenance medication and has met the criteria specified in the Ambulatory Medication Refill Standing Order for eligibility, visits, laboratory, alerts and was sent to the requested pharmacy.    Requested Prescriptions     Signed Prescriptions Disp Refills    dicyclomine 20 MG Oral Tab 120 tablet 3     Sig: Take 1 tablet (20 mg total) by mouth 4 (four) times daily before meals and nightly.     Authorizing Provider: MOOSE ANNA     Ordering User: MIRA RILEY

## 2025-01-16 ENCOUNTER — HOSPITAL ENCOUNTER (OUTPATIENT)
Dept: MRI IMAGING | Facility: HOSPITAL | Age: 60
Discharge: HOME | End: 2025-01-16
Attending: SURGERY
Payer: COMMERCIAL

## 2025-01-16 DIAGNOSIS — E27.9 ADRENAL ABNORMALITY (HCC): ICD-10-CM

## 2025-01-16 PROCEDURE — A9575 INJ GADOTERATE MEGLUMI 0.1ML: HCPCS | Performed by: SURGERY

## 2025-01-16 PROCEDURE — 74183 MRI ABD W/O CNTR FLWD CNTR: CPT | Performed by: SURGERY

## 2025-01-16 RX ORDER — GADOTERATE MEGLUMINE 376.9 MG/ML
20 INJECTION INTRAVENOUS
Status: COMPLETED | OUTPATIENT
Start: 2025-01-16 | End: 2025-01-16

## 2025-01-16 RX ADMIN — GADOTERATE MEGLUMINE 20 ML: 376.9 INJECTION INTRAVENOUS at 17:50:00

## 2025-05-21 NOTE — PATIENT INSTRUCTIONS
- You were seen in clinic for regular annual check-up. We have ordered labs for you and we will call you with the results. Please obtain the bloodwork fasting for 10**12 hours. OK to drink water the day of your blood draw.      We have the lab downstairs on the first floor.  No appointment is necessary.  Lab hours are Monday-Friday 7:30 AM to 4:45 PM.  There may be Saturday hours 7 AM-11:00 AM as well.     Otherwise you can obtain the blood tests on the weekends at the main hospital or local immediate care/urgent care within the [facility].    I like to get an updated EKG by the time you obtain your blood test.    There is uncontrolled work stress related to your work environment.  This is triggering poor sleep, IBS symptoms.    We will increase your citalopram from 10 mg to 20 mg once a day indefinitely. May take 3-6 weeks to take full effect.  We can consider consider antianxiolytic medication such as Xanax/alprazolam, but this can have sedative effects.  Will hold off on this for now  - Hope you are is that we can get improvement of the work stress.  We are doing everything we can with stress relief techniques  - Ongoing therapy.    I am suspecting that improvement of stress management can help with the bowel movements.  - Increase the dicyclomine closer to 3-4 times a day I had of time during the workweek  - Okay to take medication holidays on the weekends and time off     - These are likely insect bites of unknown cause.  Possibly mosquitoes?  - If needed, can use Zyrtec/Claritin/Allegra once a day for further itching support  - The low back spot seems to be seborrheic keratosis or stuck on excessive keratin buildup.  As there is been clinical change, we will go ahead and proceed with dermatology evaluation    I suspect that we had fluctuations in weight with good weight loss.  However the weight has plateaued so we may have gained this weight back  - We have good success we will plan in place  -  Keep up with your good walking exercise  - Lets keep up with good nutrition which will be better managed after the stress is under better control      -We did review your workup with Dr. Mills and thankfully biopsies were negative for cancerous process in the anorectal area.  Monitor for changes of the hemorrhoids however.  Keep up with good fiber intake.  Hemorrhoidectomy can be considered in the future if needed  - Next colonoscopy will be due 2027  - We are keeping close eye on your PSA levels, will repeat with blood test.  Follow-up with urology as needed  -Please continue checking your blood pressures at home and notify us if they are increasing  -Monitor for changes of work-related stress, anxiety, depression.    Return to clinic in 3-4months for follow-up

## 2025-05-21 NOTE — PROGRESS NOTES
Wilian King is a 59 year old male.    HPI:     Chief Complaint   Patient presents with    Checkup     Sleep issues stomach problems rash right side         Mr. KING is a 59 year old male PMHX irritable bowel syndrome, anxiety coming in for follow-up    Work-life balance has been ok. Work stress is off the charts. May retire soon. Since March has been more stressful. Has a good supportive team. Home keeps the stress levels down. Has finanical planning in place for upcoming prison.    He had some weight loss, but regained the weight. Bowel frequency has been higher the last few weeks. 5-8 times a day. More stress related rather than food. He does not have this on weekend. The dicyclomine seems to help. It does take 2-3 days to take effect.     Still walking exercise. Sports radio and music. Gardening.    Work performance has been excellent.     Had emergent therapy evaluation. He was doing well with the celexa, but some sexual dysfunction. He was able to wean off. Decreased libido. He's hesitant to get onto medications at this time which make him sleepy during the workday.  But amenable to increasing escitalopram        HISTORY:  Past Medical History:    H/O vasectomy    1995; reversal [id number]    IBS (irritable bowel syndrome)      Past Surgical History:   Procedure Laterality Date    Colonoscopy N/A 2/15/2024    Procedure: COLONOSCOPY;  Surgeon: Rosalina Gay MD;  Location: [facility]    Hemorrhoidectomy      1985; interal hemorrhoids    Hernia surgery      Inguinal hernia repair Bilateral 1991    Tonsillectomy        Family History   Problem Relation Age of Onset    Heart Disorder Father     Cancer Father 51        Prostate Cancer, treated with seeds    Psychiatric Mother         Anxiety, Depression    Heart Disorder Maternal Grandfather     Cancer Paternal Grandmother         Stomach cancer    Heart Disorder Paternal Grandfather       Social History:   Social History     Socioeconomic History     Marital status: Single   Tobacco Use    Smoking status: Never    Smokeless tobacco: Never   Vaping Use    Vaping status: Never Used   Substance and Sexual Activity    Alcohol use: Yes     Comment: social; very rare    Drug use: Never        Medications (Active prior to today's visit):  Current Outpatient Medications   Medication Sig Dispense Refill    dicyclomine 20 MG Oral Tab Take 1 tablet (20 mg total) by mouth 4 (four) times daily before meals and nightly. 120 tablet 3    triamcinolone 0.1 % External Cream Apply topically 2 (two) times daily. 45 g 1    sildenafil 20 MG Oral Tab Take 0.5 tablets (10 mg total) by mouth daily as needed. 30 tablet 5    CITALOPRAM 10 MG Oral Tab TAKE 1 TABLET BY MOUTH EVERY DAY 90 tablet 3    acyclovir 400 MG Oral Tab Take 1 tablet (400 mg total) by mouth 3 (three) times daily. For 3 to 4 days as directed. Complete fasting labs for future refills 30 tablet 0    fluticasone propionate 50 MCG/ACT Nasal Suspension 1 spray by Nasal route as needed.      EPINEPHrine 0.3 MG/0.3ML Injection Solution Auto-injector Inject 0.3 mL (1 each total) into the muscle See Admin Instructions. Use as directed 1 each 3    hydrocortisone 2.5 % External Cream Place 1 Application rectally 2 (two) times daily. 1 each 3       Allergies:  Allergies   Allergen Reactions    Penicillins HIVES    Shellfish-Derived Products ANAPHYLAXIS         ROS:   Positive Findings indicated in BOLD    Constitutional: Fever, Chills, Weight Gain, Weight Loss, Night Sweats, Fatigue, Malaise  ENT/Mouth:  Hearing Changes, Ear Pain, Nasal Congestion, Sinus Pain, Hoarseness, Sore throat, Rhinorrhea, Swallowing Difficulty  Eyes: Eye Pain, Swelling, Redness, Foreign Body, Discharge, Vision Changes  Cardiovascular: Chest Pain, SOB, PND, Dyspnea on Exertion, Orthopnea, Claudication, Edema, Palpitations  Respiratory: Cough, Sputum, Wheezing, Shortness of breath  Gastrointestinal: Nausea, Vomiting, Diarrhea, Constipation, Pain, Heartburn,  Dysphagia, Bloody stools, Tarry stools  Genitourinary: Dysmenorrhea, Dysuria, Urinary Frequency, Hematuria, Urinary Incontinence, Urgency,  Flank Pain  Musculoskeletal: Arthralgias, Myalgias, Joint Swelling, Joint Stiffness, Back Pain, Neck Pain  Integumentary: Skin Lesions, Pruritis, Hair Changes, Jaundice, Nail changes  Neuro: Weakness, Numbness, Paresthesias, Loss of Consciousness, Syncope, Dizziness, Headache, Falls  Psych: Anxiety, Depression, Insomnia, Suicidal Ideation, Homicidal ideation, Memory Changes  Heme/Lymph: Bruising, Bleeding, Lymphadenopathy  Endocrine: Polyuria, Polydipsia, Temperature Intolerance    PHYSICAL EXAM:   Vital Signs:  Blood pressure 112/70, pulse 60, temperature 98.9 °F (37.2 °C), temperature source Oral, height 6' 1\" (1.854 m), weight 253 lb (114.8 kg).     Constitutional: No acute distress. Alert and oriented x 3.  Eyes: EOMI, PERRLA, clear sclera b/l  HENT: NCAT, Moist mucous membranes, Oropharynx without erythema or exudates  Neck: No JVD, no thyromegaly  Cardiovascular: S1, S2, no S3, no S4, Regular rate and rhythm, No murmurs/gallops/rubs.   Vascular: Equal pulses 2+ carotids no bruits or thrills/radial/DP/PT bilaterally  Respiratory: Clear to auscultation bilaterally.  No wheezes/rales/rhonchi  Gastrointestinal: Soft, nontender, nondistended. Positive bowel sounds x 4. No rebound tenderness. No hepatomegaly, No splenomegaly  Genitourinary: No CVA tenderness bilaterally  Neurologic: No focal neurological deficits, CN II-XII intact, MSK Strength 5/5 and symmetric in all extremities, normal gait, 2+ patellar tendon, bilateral toe proprioception intact  Musculoskeletal: Full range of motion of all extremities, no clubbing/swelling/edema  Skin:   + Secondary excoriation, there are what appears to be pinpoint bites of the right lower back  There is a papule, stuck on appearance on the low back  Psychiatric: Appropriate mood and affect  Heme/Lymph/Immune: No cervical LAD      DATA  REVIEWED   Labs:  Recent Results (from the past 8760 hours)   CBC With Differential With Platelet    Collection Time: 08/24/24  7:43 AM   Result Value Ref Range    WBC 5.8 4.0 - 11.0 x10(3) uL    RBC 5.39 4.30 - 5.70 x10(6)uL    HGB 15.9 13.0 - 17.5 g/dL    HCT 48.6 39.0 - 53.0 %    MCV 90.2 80.0 - 100.0 fL    MCH 29.5 26.0 - 34.0 pg    MCHC 32.7 31.0 - 37.0 g/dL    RDW-SD 41.2 35.1 - 46.3 fL    RDW 12.6 11.0 - 15.0 %    .0 150.0 - 450.0 10(3)uL    Neutrophil Absolute Prelim 3.02 1.50 - 7.70 x10 (3) uL    Neutrophil Absolute 3.02 1.50 - 7.70 x10(3) uL    Lymphocyte Absolute 1.95 1.00 - 4.00 x10(3) uL    Monocyte Absolute 0.51 0.10 - 1.00 x10(3) uL    Eosinophil Absolute 0.26 0.00 - 0.70 x10(3) uL    Basophil Absolute 0.06 0.00 - 0.20 x10(3) uL    Immature Granulocyte Absolute 0.04 0.00 - 1.00 x10(3) uL    Neutrophil % 51.7 %    Lymphocyte % 33.4 %    Monocyte % 8.7 %    Eosinophil % 4.5 %    Basophil % 1.0 %    Immature Granulocyte % 0.7 %     *Note: Due to a large number of results and/or encounters for the requested time period, some results have not been displayed. A complete set of results can be found in Results Review.         Recent Results (from the past 8760 hours)   Comp Metabolic Panel (14)    Collection Time: 08/24/24  7:43 AM   Result Value Ref Range    Glucose 105 (H) 70 - 99 mg/dL    Sodium 143 136 - 145 mmol/L    Potassium 5.2 (H) 3.5 - 5.1 mmol/L    Chloride 110 98 - 112 mmol/L    CO2 28.0 21.0 - 32.0 mmol/L    Anion Gap 5 0 - 18 mmol/L    BUN 13 9 - 23 mg/dL    Creatinine 0.98 0.70 - 1.30 mg/dL    BUN/CREA Ratio 13.3 10.0 - 20.0    Calcium, Total 9.8 8.7 - 10.4 mg/dL    Calculated Osmolality 296 (H) 275 - 295 mOsm/kg    eGFR-Cr 90 >=60 mL/min/1.73m2    ALT 28 10 - 49 U/L    AST 23 <34 U/L    Alkaline Phosphatase 62 45 - 117 U/L    Bilirubin, Total 1.4 (H) 0.3 - 1.2 mg/dL    Total Protein 7.4 5.7 - 8.2 g/dL    Albumin 4.6 3.2 - 4.8 g/dL    Globulin  2.8 2.0 - 3.5 g/dL    A/G Ratio 1.6 1.0 -  2.0    Patient Fasting for CMP? Yes      *Note: Due to a large number of results and/or encounters for the requested time period, some results have not been displayed. A complete set of results can be found in Results Review.         Cholesterol, Total (mg/dL)   Date Value   08/24/2024 205 (H)     HDL Cholesterol (mg/dL)   Date Value   08/24/2024 40     LDL Cholesterol (mg/dL)   Date Value   08/24/2024 153 (H)     Triglycerides (mg/dL)   Date Value   08/24/2024 68       Last A1c value was 5.5% done 8/15/2023.    CT abdomen and pelvis 2/25/2024    Impression   CONCLUSION:  1. Nonspecific lobulated enhancing nodularity of the anal canal and enhancement of the right aspect of the more proximal anorectal region; these findings are not well assessed by CT. Correlation with endoscopic findings is suggested with potential  follow-up MRI (3T rectal protocol) if warranted on clinical grounds.     2. There is a left adrenal lesion with indeterminate attenuation characteristics. Follow-up MRI of the abdomen (adrenal protocol) with and without contrast is recommended for further assessment.     3. Otherwise, no acute intra-abdominal process is identified. No additional etiology of the patient's symptoms is appreciated from this study.     4. Hepatomegaly with underlying hepatic steatosis.     5. Lesser incidental findings as above.        MRI rectal cancer 3T - 3/2/2024    Impression   CONCLUSION:  1. Curvilinear peripherally enhancing lesion involving the right lateral aspect of the anal canal measuring approximately 3.0 x 0.5 x 1.7 cm.  The lesion appears to be centered within the submucosa of the anal canal with abutment of the mucosal surface  at 9 o'clock inferiorly.  A thrombosed/superinflamed hemorrhoid is a possibility.  A simple linear perianal fissure is also a consideration, however there does not appear to be extension into the intersphincteric space.  While not entirely excluded, note   the appearance is extremely  atypical for a rectal cancer.  Correlate clinically with recent endoscopy results.  2. Mild right hip joint osteoarthritis with a multiloculated 3.8 cm paralabral cyst protruding into the right gluteus minimus muscle, the latter of which is suspicious for an underlying anterior superior right hip labral tear.           Endoscopy Anoscopy 4/5/2024  Final Diagnosis:   A.  Anal papilla, biopsy:  -Polypoid squamous and colonic tissue with focal acute and chronic inflammation.    -Negative for malignancy.       B.  Right posterior dentate line biopsy:  -Squamous and colonic lined tissue with submucosal fibrosis.  -Negative for malignancy.     MRI Abdomen 1/16/2025    Impression   CONCLUSION:  1.  3 cm diameter benign lipid rich adenoma in the left adrenal gland.  2.  Hepatomegaly.           ASSESSMENT/PLAN:   Skin lesions  - Scattered Heterogeneous bites of the neck, back, no active cellulitis  - These are likely insect bites of unknown cause.  Possibly mosquitoes?  - If needed, can use Zyrtec/Claritin/Allegra once a day for further itching support  - The low back spot seems to be seborrheic keratosis or stuck on excessive keratin buildup.  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 Gay fragments of tubular adenoma x 5 noted, for which repeat colonoscopy is recommended in 3 years  - There was concern for possible AIN that is connected to an internal hemorrhoid along the rectal canal.  - This was not seen on the prior colonoscopy report from 2018.  - CT 2/25/2024: Showed nonspecific lobulated enhancing nodularity of anal canal enhancement of the right aspect of the more proximal anorectal region  - MRI 3/2/2024: Curvilinear peripherally enhancing lesion involving right lateral aspect measuring 3.0 x 0.5 x 1.7  cm, centered within the submucosa of the anal canal with abutment of the mucosal surface.  - Was evaluated by colorectal surgery, Dr. Mills 4/29/2024 -evaluated further with endoscopy, excision of hypertrophied anal papilla, large grade 3 hemorrhoids noted.  Biopsy sites were benign without evidence of dysplasia.  - Next follow-up colonoscopy will be in 3 years, 2027 with Dr. Martinez     Anxiety  PTSD  Recall that the patient.  Had been struggling with work-related stress resulting in anxiety, depression, insomnia.  He is showed significant improvement by working with a therapist, medical management, and focusing on himself. Specifically, he is worked on getting more assistance at work, trying to not overextend himself by taking on multiple projects.  It seems his work has been responsive on making positive changes, namely an  that he enjoys working with.  - Outside of work, he has been receiving good support from his family.  He is taking time for leisure activities such as traveling and reading recreationally.  - There is been a flareup since March.  More work-related stress, and concern for hostile work environment from his  despite multiple attempts of trying to alleviate this issue  - Will increase escitalopram to 20 mg.  He would like to hold off on Xanax  - Ongoing therapy  - Hopefully we can get resolution of the work stress soon.     Hyperlipidemia  ASCVD 4.5%, borderline need for statin,   - Continue with good optimization of nutrition  - Exercising as tolerated  -Repeat lipid panel     Vitamin D deficiency  Mildly deficient  - Recommended increasing to 4000 units of vitamin D3 over-the-counter, may help with symptoms of anxiety     Paresthesias  Chronic, intermittent.  Does have some intermittent back pain.  Noticeable decrease in vibration sense worse on the right side, cool temperature/light touch worse on the left side.  Broad differential, should rule out metabolic  disorder such as thyroid disease, diabetes.  I favor possible spondylosis with impingement of the sciatic nerves bilaterally  -  Secondary work-up largely unremarkable  - X-ray lumbar spine did show degenerative disc and facet disease throughout the lumbar spine most prominent at L4-L5 and L5-S1  - Seems to come and go, pending work-up we may consider neuropathic medication such as gabapentin versus Lyrica versus Cymbalta  -Can also consider EMG if symptoms are worsening.  - Symptoms seem to be improving, now just affecting the toes rather than the whole foot.     IBS  Diarrhea predominant.  .  Intermittent hematochezia that is self-limited  - MRI abdomen and pelvis 1/16/2025 3 cm diameter benign lipid rich adenoma in the left adrenal gland, hepatomegaly  -Can start a food log and determine any triggering factors  - On dicyclomine as needed  - Increase the dicyclomine closer to 3-4 times a day I had of time during the workweek  - Okay to take medication holidays on the weekends and time off        Elevated PSA  PSA 4.76; reported biopsies were benign  -PSA down to 2.00     Internal hemorrhoids  2 noted on examination today, FOBT negative  - Continue high-fiber intake, try to control IBS  - Check FIT Test: negative 8/12/2022  - Grade 3 large hemorrhoids noted on anoscopy, possible hemorrhoidectomy to be considered in the future.     Onychomycosis  - We will perform trial of Lamisil first; some improvement since last visit.  We will continue with his current management      HSV-1 infection  - Refilled acyclovir on as-needed basis on this visit     Shellfish allergy  - Has PRN EpiPen     Erectile dysfunction  - Currently on sildenafil, 10 mg/half tablet once a day.         Weight management  Concern for weight gain without other associated symptoms concerning for secondary causes.     Wt Readings from Last 6 Encounters:   05/22/25 253 lb (114.8 kg)   08/20/24 253 lb 4 oz (114.9 kg)   04/05/24 253 lb 6.4 oz (114.9 kg)    02/28/24 253 lb (114.8 kg)   02/27/24 253 lb (114.8 kg)   02/19/24 253 lb (114.8 kg)          We will obtain fasting blood work to rule out secondary causes, though low suspicion for secondary cause     We discussed the importance of net caloric reduction by:  -Optimizing nutrition.  Would benefit to focus on high-fiber intake with fruits, vegetables.  Opt for leaner meats such as chicken/fish/turkey/pork, baked rather than fried/use of high oil content.  Low-fat dairy can be incorporated.  - The goal for nutrition is to gradually decrease calories per day.  We discussed other methods to achieve this such as decreasing excessive snacking, decreasing portions, decrease the frequency of eating out.  - Weight gain is best obtained by modifying food intake.  However, exercise can help achieve caloric reduction by burning off calories.  Recommend at least 150 minutes of moderate intensity exercise for weight maintenance.  Higher intensity exercise for longer periods of time can also promote weight loss   -These lifestyle changes should be viewed as long-term even with weight loss.  This promotes overall general health and decrease risk for chronic diseases in the future.  -We will attempt a trial with conservative measures within 3 months.  If no improvement, we can consider medications or possibly referral to a specialist in the future  - There is been a plateau of weight.  Likely fluctuations given work related stress       Orders This Visit:  Orders Placed This Encounter   Procedures    CBC With Differential With Platelet    Comp Metabolic Panel (14)    TSH W Reflex To Free T4    Vitamin D    Lipid Panel       Meds This Visit:  Requested Prescriptions      No prescriptions requested or ordered in this encounter       Imaging & Referrals:  EKG 12-LEAD       Health Maintenance  Due for COVID dose #3, Prevnar 20/pneumonia shot, shingles vaccine series    Return to clinic in 3-4 months for annual physical  examination    Spent 40 minutes obtaining history, evaluating patient, discussing treatment options, diet, exercise, review of available labs and radiology reports, and completing documentation.     dEgar Lala MD, 05/22/25, 9:45 AM

## 2025-05-22 ENCOUNTER — OFFICE VISIT (OUTPATIENT)
Dept: INTERNAL MEDICINE CLINIC | Facility: CLINIC | Age: 60
End: 2025-05-22
Payer: COMMERCIAL

## 2025-05-22 VITALS
BODY MASS INDEX: 33.53 KG/M2 | WEIGHT: 253 LBS | DIASTOLIC BLOOD PRESSURE: 70 MMHG | HEART RATE: 60 BPM | TEMPERATURE: 99 F | SYSTOLIC BLOOD PRESSURE: 112 MMHG | HEIGHT: 73 IN

## 2025-05-22 DIAGNOSIS — Z56.6 WORK-RELATED STRESS: ICD-10-CM

## 2025-05-22 DIAGNOSIS — R20.2 PARESTHESIAS: ICD-10-CM

## 2025-05-22 DIAGNOSIS — R07.89 ATYPICAL CHEST PAIN: ICD-10-CM

## 2025-05-22 DIAGNOSIS — K58.9 IRRITABLE BOWEL SYNDROME, UNSPECIFIED TYPE: ICD-10-CM

## 2025-05-22 DIAGNOSIS — E55.9 VITAMIN D DEFICIENCY: ICD-10-CM

## 2025-05-22 DIAGNOSIS — E78.5 BORDERLINE HYPERLIPIDEMIA: ICD-10-CM

## 2025-05-22 DIAGNOSIS — R63.5 WEIGHT GAIN: ICD-10-CM

## 2025-05-22 DIAGNOSIS — D22.5 ATYPICAL NEVUS OF BACK: ICD-10-CM

## 2025-05-22 DIAGNOSIS — G47.9 SLEEP DIFFICULTIES: ICD-10-CM

## 2025-05-22 DIAGNOSIS — E78.2 MIXED HYPERLIPIDEMIA: Primary | ICD-10-CM

## 2025-05-22 DIAGNOSIS — R97.20 ELEVATED PSA: ICD-10-CM

## 2025-05-22 PROCEDURE — 3078F DIAST BP <80 MM HG: CPT | Performed by: INTERNAL MEDICINE

## 2025-05-22 PROCEDURE — 99215 OFFICE O/P EST HI 40 MIN: CPT | Performed by: INTERNAL MEDICINE

## 2025-05-22 PROCEDURE — 3008F BODY MASS INDEX DOCD: CPT | Performed by: INTERNAL MEDICINE

## 2025-05-22 PROCEDURE — 3074F SYST BP LT 130 MM HG: CPT | Performed by: INTERNAL MEDICINE

## 2025-05-22 SDOH — HEALTH STABILITY - MENTAL HEALTH: OTHER PHYSICAL AND MENTAL STRAIN RELATED TO WORK: Z56.6

## 2025-06-28 ENCOUNTER — LAB ENCOUNTER (OUTPATIENT)
Dept: LAB | Age: 60
End: 2025-06-28
Attending: INTERNAL MEDICINE
Payer: COMMERCIAL

## 2025-06-28 DIAGNOSIS — E55.9 VITAMIN D DEFICIENCY: ICD-10-CM

## 2025-06-28 DIAGNOSIS — E78.2 MIXED HYPERLIPIDEMIA: ICD-10-CM

## 2025-06-28 DIAGNOSIS — R63.5 WEIGHT GAIN: ICD-10-CM

## 2025-06-28 DIAGNOSIS — R20.2 PARESTHESIAS: ICD-10-CM

## 2025-06-28 LAB
ALBUMIN SERPL-MCNC: 4.7 G/DL (ref 3.2–4.8)
ALBUMIN/GLOB SERPL: 2 {RATIO} (ref 1–2)
ALP LIVER SERPL-CCNC: 57 U/L (ref 45–117)
ALT SERPL-CCNC: 26 U/L (ref 10–49)
ANION GAP SERPL CALC-SCNC: 7 MMOL/L (ref 0–18)
AST SERPL-CCNC: 18 U/L (ref ?–34)
BASOPHILS # BLD AUTO: 0.06 X10(3) UL (ref 0–0.2)
BASOPHILS NFR BLD AUTO: 1 %
BILIRUB SERPL-MCNC: 1.1 MG/DL (ref 0.3–1.2)
BUN BLD-MCNC: 15 MG/DL (ref 9–23)
BUN/CREAT SERPL: 14.6 (ref 10–20)
CALCIUM BLD-MCNC: 9.4 MG/DL (ref 8.7–10.4)
CHLORIDE SERPL-SCNC: 109 MMOL/L (ref 98–112)
CHOLEST SERPL-MCNC: 199 MG/DL (ref ?–200)
CO2 SERPL-SCNC: 27 MMOL/L (ref 21–32)
CREAT BLD-MCNC: 1.03 MG/DL (ref 0.7–1.3)
DEPRECATED RDW RBC AUTO: 43.7 FL (ref 35.1–46.3)
EGFRCR SERPLBLD CKD-EPI 2021: 84 ML/MIN/1.73M2 (ref 60–?)
EOSINOPHIL # BLD AUTO: 0.22 X10(3) UL (ref 0–0.7)
EOSINOPHIL NFR BLD AUTO: 3.5 %
ERYTHROCYTE [DISTWIDTH] IN BLOOD BY AUTOMATED COUNT: 13 % (ref 11–15)
FASTING PATIENT LIPID ANSWER: YES
FASTING STATUS PATIENT QL REPORTED: YES
GLOBULIN PLAS-MCNC: 2.3 G/DL (ref 2–3.5)
GLUCOSE BLD-MCNC: 105 MG/DL (ref 70–99)
HCT VFR BLD AUTO: 48.7 % (ref 39–53)
HDLC SERPL-MCNC: 41 MG/DL (ref 40–59)
HGB BLD-MCNC: 15.5 G/DL (ref 13–17.5)
IMM GRANULOCYTES # BLD AUTO: 0.04 X10(3) UL (ref 0–1)
IMM GRANULOCYTES NFR BLD: 0.6 %
LDLC SERPL CALC-MCNC: 147 MG/DL (ref ?–100)
LYMPHOCYTES # BLD AUTO: 1.89 X10(3) UL (ref 1–4)
LYMPHOCYTES NFR BLD AUTO: 30.4 %
MCH RBC QN AUTO: 28.9 PG (ref 26–34)
MCHC RBC AUTO-ENTMCNC: 31.8 G/DL (ref 31–37)
MCV RBC AUTO: 90.7 FL (ref 80–100)
MONOCYTES # BLD AUTO: 0.52 X10(3) UL (ref 0.1–1)
MONOCYTES NFR BLD AUTO: 8.4 %
NEUTROPHILS # BLD AUTO: 3.49 X10 (3) UL (ref 1.5–7.7)
NEUTROPHILS # BLD AUTO: 3.49 X10(3) UL (ref 1.5–7.7)
NEUTROPHILS NFR BLD AUTO: 56.1 %
NONHDLC SERPL-MCNC: 158 MG/DL (ref ?–130)
OSMOLALITY SERPL CALC.SUM OF ELEC: 297 MOSM/KG (ref 275–295)
PLATELET # BLD AUTO: 256 10(3)UL (ref 150–450)
POTASSIUM SERPL-SCNC: 4.8 MMOL/L (ref 3.5–5.1)
PROT SERPL-MCNC: 7 G/DL (ref 5.7–8.2)
RBC # BLD AUTO: 5.37 X10(6)UL (ref 4.3–5.7)
SODIUM SERPL-SCNC: 143 MMOL/L (ref 136–145)
TRIGL SERPL-MCNC: 62 MG/DL (ref 30–149)
TSI SER-ACNC: 1.08 UIU/ML (ref 0.55–4.78)
VIT D+METAB SERPL-MCNC: 32.8 NG/ML (ref 30–100)
VLDLC SERPL CALC-MCNC: 12 MG/DL (ref 0–30)
WBC # BLD AUTO: 6.2 X10(3) UL (ref 4–11)

## 2025-06-28 PROCEDURE — 82306 VITAMIN D 25 HYDROXY: CPT

## 2025-06-28 PROCEDURE — 80053 COMPREHEN METABOLIC PANEL: CPT

## 2025-06-28 PROCEDURE — 85025 COMPLETE CBC W/AUTO DIFF WBC: CPT

## 2025-06-28 PROCEDURE — 84443 ASSAY THYROID STIM HORMONE: CPT

## 2025-06-28 PROCEDURE — 80061 LIPID PANEL: CPT

## 2025-06-28 PROCEDURE — 36415 COLL VENOUS BLD VENIPUNCTURE: CPT

## 2025-06-30 RX ORDER — CITALOPRAM HYDROBROMIDE 10 MG/1
10 TABLET ORAL DAILY
Qty: 90 TABLET | Refills: 3 | Status: SHIPPED | OUTPATIENT
Start: 2025-06-30

## 2025-06-30 RX ORDER — CITALOPRAM HYDROBROMIDE 10 MG/1
20 TABLET ORAL DAILY
Qty: 180 TABLET | Refills: 0 | Status: SHIPPED | OUTPATIENT
Start: 2025-06-30 | End: 2025-06-30

## 2025-06-30 NOTE — TELEPHONE ENCOUNTER
Per MD note:   \"We will increase your citalopram from 10 mg to 20 mg once a day indefinitely. May take 3-6 weeks to take full effect \"    Refill request is for a maintenance medication and has met the criteria specified in the Ambulatory Medication Refill Standing Order for eligibility, visits, laboratory, alerts and was sent to the requested pharmacy.    Requested Prescriptions     Signed Prescriptions Disp Refills    citalopram 10 MG Oral Tab 180 tablet 0     Sig: Take 2 tablets (20 mg total) by mouth daily.     Authorizing Provider: MOOSE ANNA     Ordering User: OMAR GRIDER

## 2025-08-24 PROBLEM — N52.9 ERECTILE DYSFUNCTION: Status: ACTIVE | Noted: 2020-02-25

## 2025-08-24 PROBLEM — B00.1 RECURRENT COLD SORES: Status: ACTIVE | Noted: 2020-02-25

## 2025-08-24 PROBLEM — K58.9 IRRITABLE BOWEL SYNDROME: Status: ACTIVE | Noted: 2021-07-26

## 2025-08-24 PROBLEM — K62.89 HYPERTROPHIED ANAL PAPILLA: Status: ACTIVE | Noted: 2024-04-05

## 2025-08-24 PROBLEM — E66.9 OBESITY (BMI 30-39.9): Status: ACTIVE | Noted: 2019-02-25

## 2025-08-24 RX ORDER — CITALOPRAM HYDROBROMIDE 10 MG/1
10 TABLET ORAL DAILY
COMMUNITY
Start: 2025-06-30

## 2025-08-24 RX ORDER — FLUTICASONE PROPIONATE 50 MCG
1 SPRAY, SUSPENSION (ML) NASAL DAILY
COMMUNITY

## 2025-08-24 RX ORDER — DICYCLOMINE HCL 20 MG
20 TABLET ORAL DAILY PRN
COMMUNITY

## 2025-08-26 ENCOUNTER — APPOINTMENT (OUTPATIENT)
Dept: INTERNAL MEDICINE | Age: 60
End: 2025-08-26

## 2025-08-26 DIAGNOSIS — Z13.220 SCREENING FOR LIPOID DISORDERS: ICD-10-CM

## 2025-08-26 DIAGNOSIS — B00.1 RECURRENT COLD SORES: ICD-10-CM

## 2025-08-26 DIAGNOSIS — Z00.00 ANNUAL PHYSICAL EXAM: Primary | ICD-10-CM

## 2025-08-26 DIAGNOSIS — K58.9 IRRITABLE BOWEL SYNDROME, UNSPECIFIED TYPE: ICD-10-CM

## 2025-08-26 DIAGNOSIS — Z13.0 SCREENING FOR DEFICIENCY ANEMIA: ICD-10-CM

## 2025-08-26 DIAGNOSIS — Z00.00 LABORATORY TESTS ORDERED AS PART OF A COMPLETE PHYSICAL EXAM (CPE): ICD-10-CM

## 2025-08-26 DIAGNOSIS — Z00.00 ROUTINE GENERAL MEDICAL EXAMINATION AT A HEALTH CARE FACILITY: ICD-10-CM

## 2025-08-26 SDOH — ECONOMIC STABILITY: HOUSING INSECURITY: DO YOU HAVE PROBLEMS WITH ANY OF THE FOLLOWING?: NONE OF THE ABOVE

## 2025-08-26 SDOH — ECONOMIC STABILITY: TRANSPORTATION INSECURITY
IN THE PAST 12 MONTHS, HAS LACK OF RELIABLE TRANSPORTATION KEPT YOU FROM MEDICAL APPOINTMENTS, MEETINGS, WORK OR FROM GETTING THINGS NEEDED FOR DAILY LIVING?: NO

## 2025-08-26 SDOH — ECONOMIC STABILITY: HOUSING INSECURITY: WHAT IS YOUR LIVING SITUATION TODAY?: I HAVE A STEADY PLACE TO LIVE

## 2025-08-26 SDOH — ECONOMIC STABILITY: FOOD INSECURITY: WITHIN THE PAST 12 MONTHS, THE FOOD YOU BOUGHT JUST DIDN'T LAST AND YOU DIDN'T HAVE MONEY TO GET MORE.: NEVER TRUE

## 2025-08-26 ASSESSMENT — SOCIAL DETERMINANTS OF HEALTH (SDOH)
IN THE PAST 12 MONTHS, HAS THE ELECTRIC, GAS, OIL, OR WATER COMPANY THREATENED TO SHUT OFF SERVICE IN YOUR HOME?: NO
IN THE PAST 12 MONTHS, HAS THE ELECTRIC, GAS, OIL, OR WATER COMPANY THREATENED TO SHUT OFF SERVICE IN YOUR HOME?: NO

## 2025-09-04 ENCOUNTER — APPOINTMENT (OUTPATIENT)
Dept: INTERNAL MEDICINE | Age: 60
End: 2025-09-04

## 2025-09-04 ASSESSMENT — PATIENT HEALTH QUESTIONNAIRE - PHQ9
1. LITTLE INTEREST OR PLEASURE IN DOING THINGS: NOT AT ALL
SUM OF ALL RESPONSES TO PHQ9 QUESTIONS 1 AND 2: 0
SUM OF ALL RESPONSES TO PHQ9 QUESTIONS 1 AND 2: 0
2. FEELING DOWN, DEPRESSED OR HOPELESS: NOT AT ALL
SUM OF ALL RESPONSES TO PHQ9 QUESTIONS 1 AND 2: 0
1. LITTLE INTEREST OR PLEASURE IN DOING THINGS: NOT AT ALL
SUM OF ALL RESPONSES TO PHQ9 QUESTIONS 1 AND 2: 0
2. FEELING DOWN, DEPRESSED OR HOPELESS: NOT AT ALL

## 2026-03-05 ENCOUNTER — APPOINTMENT (OUTPATIENT)
Dept: INTERNAL MEDICINE | Age: 61
End: 2026-03-05

## (undated) DIAGNOSIS — B00.9 HSV-1 INFECTION: ICD-10-CM

## (undated) DEVICE — GLOVE SRG PROTEGRITY 7.5

## (undated) DEVICE — SLEEVE COMPR MD KNEE LEN SGL USE KENDALL SCD

## (undated) DEVICE — KIT ENDO ORCAPOD 160/180/190

## (undated) DEVICE — STANDARD HYPODERMIC NEEDLE,POLYPROPYLENE HUB: Brand: MONOJECT

## (undated) DEVICE — RECTAL CDS-LF: Brand: MEDLINE INDUSTRIES, INC.

## (undated) DEVICE — JELLY,LUBE,STERILE,FLIP TOP,TUBE,2-OZ: Brand: MEDLINE

## (undated) DEVICE — NEEDLE SPNL 22GA L3.5IN BLK QNCKE STYL DISP

## (undated) DEVICE — GLOVE SUR 7.5 SENSICARE PI PIP GRN PWD F

## (undated) DEVICE — TUBING SCT CLR 6FT .25IN MDVC

## (undated) DEVICE — Device

## (undated) DEVICE — GAUZE,SPONGE,4"X4",12PLY,STERILE,LF,2'S: Brand: MEDLINE

## (undated) DEVICE — SYRINGE REGULAR TIP 60ML

## (undated) DEVICE — SYRINGE MED 10ML LL TIP W/O SFTY DISP

## (undated) DEVICE — SNARE OPTMZ PLPCTM TRP

## (undated) DEVICE — PENCIL SMK EVAC L10FT MPLR BLDE JAW OPN

## (undated) DEVICE — SOLUTION IRRIG 1000ML 0.9% NACL USP BTL

## (undated) DEVICE — SNARE POLYP 10MM X 230MM CLD OVL ROT W/ 2.8MM

## (undated) DEVICE — GLOVE SUR 7 SENSICARE PI PIP CRM PWD F

## (undated) DEVICE — KIT CLEAN ENDOKIT 1.1OZ GOWNX2

## (undated) DEVICE — UNDERPANTS MAT 2XL FOR 24-64IN WAIST PUR

## (undated) DEVICE — CANNULA NASAL ADULT PIGTAIL L7

## (undated) NOTE — LETTER
24    Patient: Wilian King  : 10/2/1965 Visit date: 3/4/2024    Dear  Edgar Lala MD    Thank you for referring Wilian King to my practice.  Please find my assessment and plan below.     Assessment   1. Rectal mass        This is a very nice 58-year-old gentleman referred to me as a colorectal specialist with concern for an anorectal mass/abnormal tissue within the anal canal.  Patient had a positive FIT test and underwent a diagnostic colonoscopy with Dr. Gay at [facility] on 2/15/2024.  This colonoscopy revealed 5 polyps, largest measuring 6 mm per report.  In addition, Dr. Gay reports \"A retroflexed view of the rectum revealed hemorrhoids and one prolapsing likely anal papillae but irregular tissue distally not biopsied given risk of pain and bleeding.\"  Dr. Gay recommending having the hemorrhoid/anal papilla evaluated by surgery.    Patient followed up with his primary care provider, Dr. Fletcher, and was told that there was concern for possible anal dysplasia/AIN. Dr. Fletcher ordered a CT scan of the abdomen and pelvis performed on 2024 showing, \"Nonspecific lobulated enhancing nodularity of the anal canal and enhancement of the right aspect of the more proximal anorectal region; these findings are not well assessed by CT. Correlation with endoscopic findings is suggested with potential follow-up MRI (3T rectal protocol) if warranted on clinical grounds.\"  Patient was also evaluated by Dr. Debbi Dueñas, a general surgeon at [facility].  Her exam with anoscopy showed \"Rectal internal and external hemorrhoids without bleeding, radial white raised anal lesion noted, anoscopy limited exam, hemorrhoids, no rectal ulceration of obvious mass.\"  Dr. Dueñas recommended a pelvic MRI and referral to colorectal surgery.    MRI performed at [facility] on 3/2/2024 showed \"Curvilinear peripherally enhancing lesion involving the right lateral aspect of the anal canal measuring approximately  3.0 x 0.5 x 1.7 cm.  The lesion appears to be centered within the submucosa of the anal canal with abutment of the mucosal surface   at 9 o'clock inferiorly.  A thrombosed/superinflamed hemorrhoid is a possibility.  A simple linear perianal fissure is also a consideration, however there does not appear to be extension into the intersphincteric space.  While not entirely excluded, note the appearance is extremely atypical for a rectal cancer.  Correlate clinically with recent endoscopy results.\"    Patient denies any rectal pain, bleeding or prolapse.  He does practice anal receptive intercourse.  He did note his last attempt at anal receptive intercourse was painful about 1 month ago.  He has no history of HPV, HIV or smoking.  Patient did have a hemorrhoidectomy around age 19 and he recalls a very painful postoperative recovery period.  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    External exam of the anus reveals 2 small areas of external hemorrhoid/skin tags with partially prolapsing internal hemorrhoids.  There is no external mass or skin lesion.  Digital rectal exam shows subtle nodular tissue versus scarring along the right anterior anal canal with good tone, no drainage or bleeding.  Anoscopy reveals internal hemorrhoids with some subtle scaling and plaque-like tissue overlying the hemorrhoid tissue most prominent in the right anterior/right lateral position.  Exam was somewhat limited due to patient discomfort.    Plan  I discussed my exam findings with the patient.  At this point, there has been no attempt to obtain tissue diagnosis of this questionable abnormal tissue in the anal canal noted on endoscopic examination, bedside examination and imaging studies.  Differential diagnosis includes but not limited to squamous metaplasia of chronically prolapsing hemorrhoid tissue, anal dysplasia and less likely malignancy.    Though my suspicion for malignancy is fairly  low at this time, I do recommend planning for anal exam under anesthesia with anoscopy and likely biopsies of any questionable/suspicious tissue within the anal canal.  The details of this procedure were discussed including the expected recovery time, risks, benefits and alternatives.  Patient expressed understanding and was agreeable to schedule surgery with me.  This has been scheduled for Friday 4/5/2024.  Any next steps in management will be determined pending intraoperative findings and pathology results for many biopsies taken during this procedure.      Sincerely,       Dinesh Mills MD   CC:   No Recipients

## (undated) NOTE — LETTER
[facility]  Administration of Anesthesia  Wilian PAVON Docecil agree to be cared for by a physician anesthesiologist alone and/or with a nurse anesthetist, who is specially trained to monitor me and give me medicine to put me to sleep or keep me comfortable during my procedure    I understand that my anesthesiologist and/or anesthetist is not an employee or agent of [facility] or [employer]. He or she works for [facility]    As the patient asking for anesthesia services, I agree to:  Allow the anesthesiologist (anesthesia doctor) to give me medicine and do additional procedures as necessary. Some examples are: Starting or using an “IV” to give me medicine, fluids or blood during my procedure, and having a breathing tube placed to help me breathe when I’m asleep (intubation). In the event that my heart stops working properly, I understand that my anesthesiologist will make every effort to sustain my life, unless otherwise directed by [facility] Do Not Resuscitate documents.  Tell my anesthesia doctor before my procedure:  If I am pregnant.  The last time that I ate or drank.  iii. All of the medicines I take (including prescriptions, herbal supplements, and pills I can buy without a prescription (including street drugs/illegal medications). Failure to inform my anesthesiologist about these medicines may increase my risk of anesthetic complications.  iv.If I am allergic to anything or have had a reaction to anesthesia before.  I understand how the anesthesia medicine will help me (benefits).  I understand that with any type of anesthesia medicine there are risks:  The most common risks are: nausea, vomiting, sore throat, muscle soreness, damage to my eyes, mouth, or teeth (from breathing tube placement).  Rare risks include: remembering what happened during my procedure, allergic reactions to medications, injury to my airway, heart, lungs, vision, nerves, or  muscles and in extremely rare instances death.  My doctor has explained to me other choices available to me for my care (alternatives).  Pregnant Patients (“epidural”):  I understand that the risks of having an epidural (medicine given into my back to help control pain during labor), include itching, low blood pressure, difficulty urinating, headache or slowing of the baby’s heart. Very rare risks include infection, bleeding, seizure, irregular heart rhythms and nerve injury.  Regional Anesthesia (“spinal”, “epidural”, & “nerve blocks”):  I understand that rare but potential complications include headache, bleeding, infection, seizure, irregular heart rhythms, and nerve injury.    _____________________________________________________________________________  Patient (or Representative) Signature/Relationship to Patient  Date   Time    _____________________________________________________________________________   Name (if used)    Language/Organization   Time    _____________________________________________________________________________  Nurse Anesthetist Signature     Date   Time  _____________________________________________________________________________  Anesthesiologist Signature     Date   Time  I have discussed the procedure and information above with the patient (or patient’s representative) and answered their questions. The patient or their representative has agreed to have anesthesia services.    _____________________________________________________________________________  Witness        Date   Time  I have verified that the signature is that of the patient or patient’s representative, and that it was signed before the procedure  Patient Name: Wilian GAYTAN Docecil     : 10/2/1965                 Printed: 2024 at 2:38 PM    Medical Record #: [id number]                                            Page 1 of 1  ----------ANESTHESIA CONSENT----------

## (undated) NOTE — LETTER
[facility]  [address], [place], [place]  Authorization for Surgical Operation and Procedure                                                                                           I hereby authorize Rosalina Gay MD, my physician and his/her assistants (if applicable), which may include medical students, residents, and/or fellows, to perform the following surgical operation/ procedure and administer such anesthesia as may be determined necessary by my physician: Operation/Procedure name (s) COLONOSCOPY on Wilian LUCILA Christine   2.   I recognize that during the surgical operation/procedure, unforeseen conditions may necessitate additional or different procedures than those listed above.  I, therefore, further authorize and request that the above-named surgeon, assistants, or designees perform such procedures as are, in their judgment, necessary and desirable.    3.   My surgeon/physician has discussed prior to my surgery the potential benefits, risks and side effects of this procedure; the likelihood of achieving goals; and potential problems that might occur during recuperation.  They also discussed reasonable alternatives to the procedure, including risks, benefits, and side effects related to the alternatives and risks related to not receiving this procedure.  I have had all my questions answered and I acknowledge that no guarantee has been made as to the result that may be obtained.    4.   Should the need arise during my operation/procedure, which includes change of level of care prior to discharge, I also consent to the administration of blood and/or blood products.  Further, I understand that despite careful testing and screening of blood or blood products by collecting agencies, I may still be subject to ill effects as a result of receiving a blood transfusion and/or blood products.  The following are some, but not all, of the potential risks that can occur: fever and allergic reactions,  hemolytic reactions, transmission of diseases such as Hepatitis, AIDS and Cytomegalovirus (CMV) and fluid overload.  In the event that I wish to have an autologous transfusion of my own blood, or a directed donor transfusion, I will discuss this with my physician.  Check only if Refusing Blood or Blood Products  I understand refusal of blood or blood products as deemed necessary by my physician may have serious consequences to my condition to include possible death. I hereby assume responsibility for my refusal and release the hospital, its personnel, and my physicians from any responsibility for the consequences of my refusal.    o  Refuse   5.   I authorize the use of any specimen, organs, tissues, body parts or foreign objects that may be removed from my body during the operation/procedure for diagnosis, research or teaching purposes and their subsequent disposal by hospital authorities.  I also authorize the release of specimen test results and/or written reports to my treating physician on the hospital medical staff or other referring or consulting physicians involved in my care, at the discretion of the Pathologist or my treating physician.    6.   I consent to the photographing or videotaping of the operations or procedures to be performed, including appropriate portions of my body for medical, scientific, or educational purposes, provided my identity is not revealed by the pictures or by descriptive texts accompanying them.  If the procedure has been photographed/videotaped, the surgeon will obtain the original picture, image, videotape or CD.  The hospital will not be responsible for storage, release or maintenance of the picture, image, tape or CD.    7.   I consent to the presence of a  or observers in the operating room as deemed necessary by my physician or their designees.    8.   I recognize that in the event my procedure results in extended X-Ray/fluoroscopy time, I may develop a  skin reaction.    9. If I have a Do Not Attempt Resuscitation (DNAR) order in place, that status will be suspended while in the operating room, procedural suite, and during the recovery period unless otherwise explicitly stated by me (or a person authorized to consent on my behalf). The surgeon or my attending physician will determine when the applicable recovery period ends for purposes of reinstating the DNAR order.  10. Patients having a sterilization procedure: I understand that if the procedure is successful the results will be permanent and it will therefore be impossible for me to inseminate, conceive, or bear children.  I also understand that the procedure is intended to result in sterility, although the result has not been guaranteed.   11. I acknowledge that my physician has explained sedation/analgesia administration to me including the risk and benefits I consent to the administration of sedation/analgesia as may be necessary or desirable in the judgment of my physician.    I CERTIFY THAT I HAVE READ AND FULLY UNDERSTAND THE ABOVE CONSENT TO OPERATION and/or OTHER PROCEDURE.     _________________________________________ _________________________________     ___________________________________  Signature of Patient     Signature of Responsible Person                   Printed Name of Responsible Person                              _________________________________________ ______________________________        ___________________________________  Signature of Witness         Date  Time         Relationship to Patient    STATEMENT OF PHYSICIAN My signature below affirms that prior to the time of the procedure; I have explained to the patient and/or his/her legal representative, the risks and benefits involved in the proposed treatment and any reasonable alternative to the proposed treatment. I have also explained the risks and benefits involved in refusal of the proposed treatment and alternatives to the  proposed treatment and have answered the patient's questions. If I have a significant financial interest in a co-management agreement or a significant financial interest in any product or implant, or other significant relationship used in this procedure/surgery, I have disclosed this and had a discussion with my patient.     _______________________________________________________________ _____________________________  (Signature of Physician)                                                                                         (Date)                                   (Time)  Patient Name: Wilian King    : 10/2/1965   Printed: 2024      Medical Record #: [id number]                                              Page 1 of 1

## (undated) NOTE — LETTER
24    Patient: Wilian King  : 10/2/1965 Visit date: 2024    Dear  Edgar Lala MD    Thank you for referring Wilian King to my practice.  Please find my assessment and plan below.    Assessment   1. Abnormality of rectum      This is a very nice 58-year-old gentleman referred to me as a colorectal specialist with concern for an anorectal mass/abnormal tissue within the anal canal.  Patient had a positive FIT test and underwent a diagnostic colonoscopy with Dr. Gay at [facility] on 2/15/2024.  This colonoscopy revealed 5 polyps, largest measuring 6 mm per report.  In addition, Dr. Gay reports \"A retroflexed view of the rectum revealed hemorrhoids and one prolapsing likely anal papillae but irregular tissue distally not biopsied given risk of pain and bleeding.\"  Dr. Gay recommending having the hemorrhoid/anal papilla evaluated by surgery.     Patient followed up with his primary care provider, Dr. Fletcher, and was told that there was concern for possible anal dysplasia/AIN. Dr. Fletcher ordered a CT scan of the abdomen and pelvis performed on 2024 showing, \"Nonspecific lobulated enhancing nodularity of the anal canal and enhancement of the right aspect of the more proximal anorectal region; these findings are not well assessed by CT. Correlation with endoscopic findings is suggested with potential follow-up MRI (3T rectal protocol) if warranted on clinical grounds.\"  Patient was also evaluated by Dr. Debbi Dueñas, a general surgeon at [facility].  Her exam with anoscopy showed \"Rectal internal and external hemorrhoids without bleeding, radial white raised anal lesion noted, anoscopy limited exam, hemorrhoids, no rectal ulceration of obvious mass.\"  Dr. Dueñas recommended a pelvic MRI and referral to colorectal surgery.     MRI performed at [facility] on 3/2/2024 showed \"Curvilinear peripherally enhancing lesion involving the right lateral aspect of the anal canal measuring  approximately 3.0 x 0.5 x 1.7 cm.  The lesion appears to be centered within the submucosa of the anal canal with abutment of the mucosal surface at 9 o'clock inferiorly.  A thrombosed/superinflamed hemorrhoid is a possibility.  A simple linear perianal fissure is also a consideration, however there does not appear to be extension into the intersphincteric space.  While not entirely excluded, note the appearance is extremely atypical for a rectal cancer.  Correlate clinically with recent endoscopy results.\"    Most recently, I took the patient for anal exam under anesthesia with anoscopy, excision of hypertrophied anal papilla and biopsy on 4/5/2024. I discovered a small left lateral hypertrophied anal papilla.  Large grade 3 hemorrhoids in the right anterior, right posterior and left lateral positions with both internal and external components.  Longitudinal white scarring along the right posterior anal canal.  Overall, no suspicious masses or lesions appreciated on external exam of the anus, digital rectal exam and anoscopy during this procedure.     Pathology from the anal papilla and right posterior dentate line biopsies were benign without any evidence of dysplasia or malignancy.    Patient returns for follow-up today.  He is not experiencing any anorectal symptoms.  He has noticed that his hemorrhoids have become bigger and more enlarged over the last several months.  His only concern is that they do limit his ability to participate in anal receptive intercourse.  Otherwise, they are not bothering him to the point where he would consider repeat hemorrhoidectomy.    Plan   I counseled patient that at this point his anal canal and rectum have been thoroughly investigated endoscopically, with MRI and was recently through anal exam under anesthesia with anoscopy.  There is no evidence to suggest malignancy or even dysplasia.    In regards to the recurrent enlarged hemorrhoids with both internal and external  components, I advised continuing a high-fiber diet, considering a daily fiber supplement, drinking at least 64 ounces of fluids daily and limiting toilet time/straining.  He can take sitz bath's and/or use over-the-counter hemorrhoid medication as needed.  He was not interested in considering any further procedural or surgical intervention for the hemorrhoids at this time.    No further follow-up scheduled, but patient is welcome to see me with any future anorectal or surgical questions/concerns.  Patient instructed to follow-up with Dr. Dueñas in regards to the left adrenal lesion seen on his CT scan on 2/25/2024.  Patient expressed understanding and was agreeable to plan.       Sincerely,       Dinesh Mills MD   CC:   No Recipients